# Patient Record
Sex: MALE | Race: WHITE
[De-identification: names, ages, dates, MRNs, and addresses within clinical notes are randomized per-mention and may not be internally consistent; named-entity substitution may affect disease eponyms.]

---

## 2017-12-28 ENCOUNTER — HOSPITAL ENCOUNTER (INPATIENT)
Dept: HOSPITAL 17 - NEPC | Age: 67
LOS: 7 days | Discharge: NURSING, REHAB OR LONG-TERM CARE | DRG: 871 | End: 2018-01-04
Attending: HOSPITALIST | Admitting: HOSPITALIST
Payer: COMMERCIAL

## 2017-12-28 VITALS — WEIGHT: 147.71 LBS | BODY MASS INDEX: 21.88 KG/M2 | HEIGHT: 69 IN

## 2017-12-28 VITALS
DIASTOLIC BLOOD PRESSURE: 65 MMHG | TEMPERATURE: 97.8 F | RESPIRATION RATE: 16 BRPM | SYSTOLIC BLOOD PRESSURE: 142 MMHG | HEART RATE: 101 BPM | OXYGEN SATURATION: 97 %

## 2017-12-28 VITALS
SYSTOLIC BLOOD PRESSURE: 135 MMHG | HEART RATE: 88 BPM | RESPIRATION RATE: 18 BRPM | DIASTOLIC BLOOD PRESSURE: 65 MMHG | OXYGEN SATURATION: 94 %

## 2017-12-28 VITALS — OXYGEN SATURATION: 98 %

## 2017-12-28 DIAGNOSIS — I67.2: ICD-10-CM

## 2017-12-28 DIAGNOSIS — N17.9: ICD-10-CM

## 2017-12-28 DIAGNOSIS — K59.00: ICD-10-CM

## 2017-12-28 DIAGNOSIS — E11.10: ICD-10-CM

## 2017-12-28 DIAGNOSIS — N13.30: ICD-10-CM

## 2017-12-28 DIAGNOSIS — R74.8: ICD-10-CM

## 2017-12-28 DIAGNOSIS — E86.0: ICD-10-CM
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
## 2017-12-28 DIAGNOSIS — A41.9: Primary | ICD-10-CM
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
## 2017-12-28 DIAGNOSIS — R60.9: ICD-10-CM

## 2017-12-28 DIAGNOSIS — R65.20: ICD-10-CM

## 2017-12-28 DIAGNOSIS — R32: ICD-10-CM

## 2017-12-28 DIAGNOSIS — R33.9: ICD-10-CM

## 2017-12-28 DIAGNOSIS — N32.0: ICD-10-CM

## 2017-12-28 DIAGNOSIS — S09.90XA: ICD-10-CM

## 2017-12-28 DIAGNOSIS — E88.09: ICD-10-CM

## 2017-12-28 DIAGNOSIS — N49.2: ICD-10-CM

## 2017-12-28 DIAGNOSIS — N50.89: ICD-10-CM

## 2017-12-28 DIAGNOSIS — D64.9: ICD-10-CM

## 2017-12-28 DIAGNOSIS — Z86.73: ICD-10-CM

## 2017-12-28 DIAGNOSIS — I63.9: ICD-10-CM

## 2017-12-28 DIAGNOSIS — B37.0: ICD-10-CM

## 2017-12-28 DIAGNOSIS — I31.3: ICD-10-CM

## 2017-12-28 DIAGNOSIS — Z82.49: ICD-10-CM

## 2017-12-28 DIAGNOSIS — D65: ICD-10-CM
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
## 2017-12-28 DIAGNOSIS — N39.0: ICD-10-CM

## 2017-12-28 DIAGNOSIS — Z83.3: ICD-10-CM

## 2017-12-28 DIAGNOSIS — E11.00: ICD-10-CM

## 2017-12-28 LAB
ALBUMIN SERPL-MCNC: 2.2 GM/DL (ref 3.4–5)
ALP SERPL-CCNC: 188 U/L (ref 45–117)
ALT SERPL-CCNC: 14 U/L (ref 12–78)
AST SERPL-CCNC: 21 U/L (ref 15–37)
BASOPHILS # BLD AUTO: 0 TH/MM3 (ref 0–0.2)
BASOPHILS NFR BLD: 0.1 % (ref 0–2)
BILIRUB SERPL-MCNC: 0.6 MG/DL (ref 0.2–1)
BUN SERPL-MCNC: 55 MG/DL (ref 7–18)
CALCIUM SERPL-MCNC: 7.7 MG/DL (ref 8.5–10.1)
CHLORIDE SERPL-SCNC: 77 MEQ/L (ref 98–107)
CREAT SERPL-MCNC: 3.14 MG/DL (ref 0.6–1.3)
EOSINOPHIL # BLD: 0 TH/MM3 (ref 0–0.4)
EOSINOPHIL NFR BLD: 0 % (ref 0–4)
ERYTHROCYTE [DISTWIDTH] IN BLOOD BY AUTOMATED COUNT: 12.5 % (ref 11.6–17.2)
GFR SERPLBLD BASED ON 1.73 SQ M-ARVRAT: 20 ML/MIN (ref 89–?)
GLUCOSE SERPL-MCNC: 1618 MG/DL (ref 74–106)
HCO3 BLD-SCNC: 12.8 MEQ/L (ref 21–32)
HCT VFR BLD CALC: 39 % (ref 39–51)
HGB BLD-MCNC: 12.1 GM/DL (ref 13–17)
LIPASE: 152 U/L (ref 73–393)
LYMPHOCYTES # BLD AUTO: 0.5 TH/MM3 (ref 1–4.8)
LYMPHOCYTES NFR BLD AUTO: 2.9 % (ref 9–44)
MAGNESIUM SERPL-MCNC: 1.8 MG/DL (ref 1.5–2.5)
MCH RBC QN AUTO: 30.2 PG (ref 27–34)
MCHC RBC AUTO-ENTMCNC: 31 % (ref 32–36)
MCV RBC AUTO: 97.3 FL (ref 80–100)
MONOCYTE #: 0.5 TH/MM3 (ref 0–0.9)
MONOCYTES NFR BLD: 3.2 % (ref 0–8)
NEUTROPHILS # BLD AUTO: 14.9 TH/MM3 (ref 1.8–7.7)
NEUTROPHILS NFR BLD AUTO: 93.8 % (ref 16–70)
PLATELET # BLD: 216 TH/MM3 (ref 150–450)
PMV BLD AUTO: 10.7 FL (ref 7–11)
PROT SERPL-MCNC: 5.8 GM/DL (ref 6.4–8.2)
RBC # BLD AUTO: 4.01 MIL/MM3 (ref 4.5–5.9)
SODIUM SERPL-SCNC: 107 MEQ/L (ref 136–145)
WBC # BLD AUTO: 15.9 TH/MM3 (ref 4–11)

## 2017-12-28 PROCEDURE — 71010: CPT

## 2017-12-28 PROCEDURE — 70450 CT HEAD/BRAIN W/O DYE: CPT

## 2017-12-28 PROCEDURE — 85730 THROMBOPLASTIN TIME PARTIAL: CPT

## 2017-12-28 PROCEDURE — 83880 ASSAY OF NATRIURETIC PEPTIDE: CPT

## 2017-12-28 PROCEDURE — 36556 INSERT NON-TUNNEL CV CATH: CPT

## 2017-12-28 PROCEDURE — 70544 MR ANGIOGRAPHY HEAD W/O DYE: CPT

## 2017-12-28 PROCEDURE — 84100 ASSAY OF PHOSPHORUS: CPT

## 2017-12-28 PROCEDURE — 82140 ASSAY OF AMMONIA: CPT

## 2017-12-28 PROCEDURE — 83605 ASSAY OF LACTIC ACID: CPT

## 2017-12-28 PROCEDURE — 80202 ASSAY OF VANCOMYCIN: CPT

## 2017-12-28 PROCEDURE — 84300 ASSAY OF URINE SODIUM: CPT

## 2017-12-28 PROCEDURE — 87086 URINE CULTURE/COLONY COUNT: CPT

## 2017-12-28 PROCEDURE — 93306 TTE W/DOPPLER COMPLETE: CPT

## 2017-12-28 PROCEDURE — 76870 US EXAM SCROTUM: CPT

## 2017-12-28 PROCEDURE — 84484 ASSAY OF TROPONIN QUANT: CPT

## 2017-12-28 PROCEDURE — 85384 FIBRINOGEN ACTIVITY: CPT

## 2017-12-28 PROCEDURE — 74176 CT ABD & PELVIS W/O CONTRAST: CPT

## 2017-12-28 PROCEDURE — 83036 HEMOGLOBIN GLYCOSYLATED A1C: CPT

## 2017-12-28 PROCEDURE — 82550 ASSAY OF CK (CPK): CPT

## 2017-12-28 PROCEDURE — 84443 ASSAY THYROID STIM HORMONE: CPT

## 2017-12-28 PROCEDURE — 85610 PROTHROMBIN TIME: CPT

## 2017-12-28 PROCEDURE — 82947 ASSAY GLUCOSE BLOOD QUANT: CPT

## 2017-12-28 PROCEDURE — 87641 MR-STAPH DNA AMP PROBE: CPT

## 2017-12-28 PROCEDURE — 96374 THER/PROPH/DIAG INJ IV PUSH: CPT

## 2017-12-28 PROCEDURE — 80061 LIPID PANEL: CPT

## 2017-12-28 PROCEDURE — 85025 COMPLETE CBC W/AUTO DIFF WBC: CPT

## 2017-12-28 PROCEDURE — 72125 CT NECK SPINE W/O DYE: CPT

## 2017-12-28 PROCEDURE — 93005 ELECTROCARDIOGRAM TRACING: CPT

## 2017-12-28 PROCEDURE — 80053 COMPREHEN METABOLIC PANEL: CPT

## 2017-12-28 PROCEDURE — 82150 ASSAY OF AMYLASE: CPT

## 2017-12-28 PROCEDURE — 80048 BASIC METABOLIC PNL TOTAL CA: CPT

## 2017-12-28 PROCEDURE — 87205 SMEAR GRAM STAIN: CPT

## 2017-12-28 PROCEDURE — 82948 REAGENT STRIP/BLOOD GLUCOSE: CPT

## 2017-12-28 PROCEDURE — 83735 ASSAY OF MAGNESIUM: CPT

## 2017-12-28 PROCEDURE — 83690 ASSAY OF LIPASE: CPT

## 2017-12-28 PROCEDURE — 81001 URINALYSIS AUTO W/SCOPE: CPT

## 2017-12-28 PROCEDURE — 93975 VASCULAR STUDY: CPT

## 2017-12-28 PROCEDURE — 83930 ASSAY OF BLOOD OSMOLALITY: CPT

## 2017-12-28 PROCEDURE — 82570 ASSAY OF URINE CREATININE: CPT

## 2017-12-28 PROCEDURE — 84155 ASSAY OF PROTEIN SERUM: CPT

## 2017-12-28 PROCEDURE — 82805 BLOOD GASES W/O2 SATURATION: CPT

## 2017-12-28 PROCEDURE — 70551 MRI BRAIN STEM W/O DYE: CPT

## 2017-12-28 PROCEDURE — 76937 US GUIDE VASCULAR ACCESS: CPT

## 2017-12-28 PROCEDURE — 80076 HEPATIC FUNCTION PANEL: CPT

## 2017-12-28 PROCEDURE — 87040 BLOOD CULTURE FOR BACTERIA: CPT

## 2017-12-28 PROCEDURE — 93880 EXTRACRANIAL BILAT STUDY: CPT

## 2017-12-28 PROCEDURE — 82010 KETONE BODYS QUAN: CPT

## 2017-12-28 NOTE — HHI.HP
HPI


Service


Critical Care Medicine


Primary Care Physician


No Primary Care Physician


Admission Diagnosis





DKA, new onset diabetes, renal deficiency, closed head injury


Diagnosis:  


Travel History


International Travel<30 Days:  No


Contact w/Intl Traveler <30 Da:  No


Traveled to Known Affected Are:  No


History of Present Illness


66 yo WM with no significant known medical history though he has not been 

followed by a physician who presents to [facility] emergency 

department complaining of two-week history of excessive thirst, weight loss, 

decreased appetite, and feeling weak.  Today he slipped and fell and hit his 

head on the tile floor.  His wife states no loss of consciousness.  Head CT 

showed no acute traumatic injury.  There is an age-indeterminate left thalamic 

infarct.  C-spine was negative.  He has hyperosmolar hyperglycemic state with 

glucose greater than 1600.  Beta hydroxybutyrate is normal.  Anion gap is 17.  

Potassium is 5.9.  He also has severe scrotal swelling and pain.  He states 

this is been going on for 2 days.  He has had urinary incontinence, dysuria and 

so the scrotal skin has remained wet and the skin is weeping and tender.   Now 

he states unable to void.  He denies flank pain, vomiting, diarrhea, fever, 

cough.  He has received 1 L normal saline bolus and insulin 6 units IV bolus in 

the emergency department.  Vancomycin and Zosyn has been ordered.  Ultrasound 

of the scrotum showed edema, normal flow to testicle.





Past Family Social History


Allergies:  


Coded Allergies:  


     No Known Allergies (Verified  Allergy, Mild, 10/5/07)


Past Medical History


None known


Past Surgical History


None


Reported Medications


Vitamins


Family History


Mother had diabetes mellitus


Father  of myocardial infarction in his 60s


No family history of cancer


Social History


Lifetime nonsmoker


No history of alcohol use


Retired 








Physical Exam


Vital Signs





Vital Signs








  Date Time  Temp Pulse Resp B/P (MAP) Pulse Ox O2 Delivery O2 Flow Rate FiO2


 


17 21:46     98 Room Air  


 


17 21:09 97.8 101 16 142/65 (90) 97 Room Air  








Physical Exam


GENERAL: Very thin  male sitting up in the stretcher.


HEAD: Atraumatic. Normocephalic. 


EYES: Pupils equal and round, 3 mm and reactive to 2 bilaterally.


ENT: No nasal bleeding or discharge.  Mucous membranes are dry.  There is 

thrush on his palate, buccal mucosa, gingiva. 


NECK: Trachea midline. No JVD. 


CARDIOVASCULAR: Regular rate and rhythm, no murmurs, rubs, gallops


RESPIRATORY: Breathing comfortably with no accessory muscle use or Kussmaul 

breathing.  Clear to auscultation bilaterally.  On room air.  No wheezes, Rales

, rhonchi.


GASTROINTESTINAL: Abdomen soft, nondistended, bowel sounds present, no rebound 

or guarding.  He does have faint erythema overlying lower abdomen and this 

extends up into the RLQ with edema there. I marked margins with a pen.  The 

edematous area is tender.  There is no mass/fluctuance.  No CVAT.  


SKIN/SCROTUM:  warm, dry, poor skin turgor without rash.   The scrotum is 

diffusely swollen and the skin is excoriated and weeping serous fluid. No 

fluctuant mass appreciated. No necrotizing skin lesion noted. No perianal 

erythema. 


MUSCULOSKELETAL: Extremities are very thin,  without clubbing, cyanosis, or 

edema. No obvious deformities. 


NEUROLOGICAL: Awake and alert. Confused but making jokes.  No obvious cranial 

nerve deficits.  Normal speech. Strength 5/5 all extremities.


Laboratory





Laboratory Tests








Test


  17


21:19 17


21:28


 


Blood Gas Puncture Site   


 


Blood Gas Patient Temperature 98.6  


 


Venous Blood pH 7.28  


 


Venous Blood Partial Pressure


CO2 30 


  


 


 


Venous Blood Partial Pressure


O2 43 


  


 


 


Venous Blood HCO3 14  


 


Venous Blood Oxygen Saturation 69  


 


Venous Blood Oxygen Content 11.8  


 


Venous Blood Base Excess -11.9  


 


Blood Gas Inspired Oxygen 21  


 


White Blood Count  15.9 


 


Red Blood Count  4.01 


 


Hemoglobin  12.1 


 


Hematocrit  39.0 


 


Mean Corpuscular Volume  97.3 


 


Mean Corpuscular Hemoglobin  30.2 


 


Mean Corpuscular Hemoglobin


Concent 


  31.0 


 


 


Red Cell Distribution Width  12.5 


 


Platelet Count  216 


 


Mean Platelet Volume  10.7 


 


Neutrophils (%) (Auto)  93.8 


 


Lymphocytes (%) (Auto)  2.9 


 


Monocytes (%) (Auto)  3.2 


 


Eosinophils (%) (Auto)  0.0 


 


Basophils (%) (Auto)  0.1 


 


Neutrophils # (Auto)  14.9 


 


Lymphocytes # (Auto)  0.5 


 


Monocytes # (Auto)  0.5 


 


Eosinophils # (Auto)  0.0 


 


Basophils # (Auto)  0.0 


 


CBC Comment  DIFF FINAL 


 


Differential Comment   


 


Blood Urea Nitrogen  55 


 


Creatinine  3.14 


 


Random Glucose  1618 


 


Total Protein  5.8 


 


Albumin  2.2 


 


Calcium Level  7.7 


 


Magnesium Level  1.8 


 


Alkaline Phosphatase  188 


 


Aspartate Amino Transf


(AST/SGOT) 


  21 


 


 


Alanine Aminotransferase


(ALT/SGPT) 


  14 


 


 


Total Bilirubin  0.6 


 


Sodium Level  107 


 


Potassium Level  5.9 


 


Chloride Level  77 


 


Carbon Dioxide Level  12.8 


 


Anion Gap  17 


 


Estimat Glomerular Filtration


Rate 


  20 


 


 


Lipase  152 


 


B-Hydroxybutyrate  0.39 








Result Diagram:  


17








Septic Shock Reassessment


Septic shock perfusion:  reassessment completed





Caprini VTE Risk Assessment


Caprini VTE Risk Assessment:  Mod/High Risk (score >= 2)


Caprini Risk Assessment Model











 Point Value = 1          Point Value = 2  Point Value = 3  Point Value = 5


 


Age 41-60


Minor surgery


BMI > 25 kg/m2


Swollen legs


Varicose veins


Pregnancy or postpartum


History of unexplained or recurrent


   spontaneous 


Oral contraceptives or hormone


   replacement


Sepsis (< 1 month)


Serious lung disease, including


   pneumonia (< 1 month)


Abnormal pulmonary function


Acute myocardial infarction


Congestive heart failure (< 1 month)


History of inflammatory bowel disease


Medical patient at bed rest Age 61-74


Arthroscopic surgery


Major open surgery (> 45 min)


Laparoscopic surgery (> 45 min)


Malignancy


Confined to bed (> 72 hours)


Immobilizing plaster cast


Central venous access Age >= 75


History of VTE


Family history of VTE


Factor V Leiden


Prothrombin 72548N


Lupus anticoagulant


Anticardiolipin antibodies


Elevated serum homocysteine


Heparin-induced thrombocytopenia


Other congenital or acquired


   thrombophilia Stroke (< 1 month)


Elective arthroplasty


Hip, pelvis, or leg fracture


Acute spinal cord injury (< 1 month)








Prophylaxis Regimen











   Total Risk


Factor Score Risk Level Prophylaxis Regimen


 


0-1      Low Early ambulation


 


2 Moderate Order ONE of the following:


*Sequential Compression Device (SCD)


*Heparin 5000 units SQ BID


 


3-4 Higher Order ONE of the following medications:


*Heparin 5000 units SQ TID


*Enoxaparin/Lovenox 40 mg SQ daily (WT < 150 kg, CrCl > 30 mL/min)


*Enoxaparin/Lovenox 30 mg SQ daily (WT < 150 kg, CrCl > 10-29 mL/min)


*Enoxaparin/Lovenox 30 mg SQ BID (WT < 150 kg, CrCl > 30 mL/min)


AND/OR


*Sequential Compression Device (SCD)


 


5 or more Highest Order ONE of the following medications:


*Heparin 5000 units SQ TID (Preferred with Epidurals)


*Enoxaparin/Lovenox 40 mg SQ daily (WT < 150 kg, CrCl > 30 mL/min)


*Enoxaparin/Lovenox 30 mg SQ daily (WT < 150 kg, CrCl > 10-29 mL/min)


*Enoxaparin/Lovenox 30 mg SQ BID (WT < 150 kg, CrCl > 30 mL/min)


AND


*Sequential Compression Device (SCD)











Assessment and Plan


Problem List:  


(1) Type 2 diabetes mellitus with hyperosmolar nonketotic hyperglycemia


ICD Code:  E11.01 - Type 2 diabetes mellitus with hyperosmolarity with coma


Status:  Chronic


(2) GRACIELA (acute kidney injury)


ICD Code:  N17.9 - Acute kidney failure, unspecified


Status:  Acute


(3) Lactic acidemia


ICD Code:  E87.2 - Acidosis


Status:  Acute


(4) Closed head injury


ICD Code:  S09.90XA - Unspecified injury of head, initial encounter


Status:  Acute


(5) Leukocytosis


ICD Code:  D72.829 - Elevated white blood cell count, unspecified


Status:  Acute


(6) Cellulitis of scrotum


ICD Code:  N49.2 - Inflammatory disorders of scrotum


Status:  Acute


(7) Fall


ICD Code:  W19.XXXA - Unspecified fall, initial encounter


Status:  Acute


(8) Lacunar stroke


ICD Code:  I63.9 - Cerebral infarction, unspecified


Status:  Chronic


(9) Urinary retention


ICD Code:  R33.9 - Retention of urine, unspecified


Status:  Acute


(10) Hydronephrosis


ICD Code:  N13.30 - Unspecified hydronephrosis


Status:  Acute


(11) Thrush


ICD Code:  B37.0 - Candidal stomatitis


Status:  Acute


(12) Severe sepsis


ICD Code:  A41.9 - Sepsis, unspecified organism; R65.20 - Severe sepsis without 

septic shock


Assessment and Plan


NEURO:


Fall 


Silent Left thalamic lacunar infarct, age indeterminate, probably chronic


CT brain - age indeterminate L thalamic lacunar infarct


CT Cspine - no acute fracture. Degen changes. 


Check lipids, carotid ultrasound, Echo





RESP:


On room air





CV:


Lactic acidemia


Monitor hemodynamics


Check baseline EKG, trend troponins. 


Obtain Echo. 


Trend lactic acid





GI: NPO





FEN/RENAL: 


Acute kidney injury


corrected sodium ~129 f/u 132. 


Baseline  creatinine unknown. 


Clinically severely dehydrated


Received 1 L NS bolus in the ED. Will bolus additional 1 L. NS @ 250ml/hr  


Insert urinary catheter. 


Monitor serial electrolytes.  Replace electrolytes as indicated. 


Check CPK


CT abd/pelvis will asses for hydro. 








ID:


Severe Sepsis


Leukocytosis


Thrush


Dysuria


Scrotal cellulitis


Vancomycin/zosyn/Clinda empirically for scrotal cellulitis. Scrotal u/s showed 

edema, normal testicular flow.  Will obtain CT abd/pelvis. 


Followup U/A/cx, obtain blood culture. 


Diflucan 100 mg IV x1. 


Nystatin for thrush. 





HEME: Chronic anemia





ENDO:


Hyperosmolar hyperglycemic state


Insulin drip with serial labs per hyperglycemic emergency protocol. 


Follow-up hemoglobin A1c


Obtain TSH





PROPH: SCDs for DVT prophylaxis.  Hold pharmacologic DVT prophylaxis pending 

results of CT scan.  Famotidine for stress ulcer prophylaxis.





ACCESS: Peripheral IV providing adequate access at this time.





Patient is critically ill with Hyperosmolar hyperglycemic state and GRACIELA 

requiring fluid rehydration, electrolyte monitoring and replacement to avoid 

coma and progression to multiorgan failure or death.  





Patient and his wife were updated at bedside.





Patient is full code.  He would like his wife to make medical decisions on his 

behalf if he becomes incapacitated.





Critical care time 50 minutes exclusive of separately billable procedures.





Problem Qualifiers





(1) Closed head injury:  


Qualified Codes:  S09.90XA - Unspecified injury of head, initial encounter


(2) Fall:  


Qualified Codes:  W19.XXXA - Unspecified fall, initial encounter








Kimberly Dupree MD Dec 28, 2017 23:23

## 2017-12-28 NOTE — RADRPT
EXAM DATE/TIME:  12/28/2017 21:57 

 

[facility] COMPARISON:     

No previous studies available for comparison.

        

 

 

INDICATIONS :     

Scrotal swelling and pain.

                     

 

MEDICAL HISTORY :          

Scrotal swelling and pain.

 

SURGICAL HISTORY :     

None. 

 

ENCOUNTER:      

Initial

 

ACUITY:      

2 days

 

PAIN SCORE:      

10/10

 

LOCATION:      

Bilateral  scrotum.

                     

 

MEASUREMENTS:     

 

RIGHT TESTICLE:        

3.9 x 3.0 x 2.6cm     

 

LEFT TESTICLE:        

3.8 x 3.0 x 2.7cm     

 

FINDINGS:     

 

RIGHT TESTICLE:     

Homogeneous echotexture without intra or extratesticular mass.  Blood flow is symmetric and within no
rmal limits.  No hydrocele or varicocele.  Epididymis is within normal limits.  

 

LEFT TESTICLE:     

Homogeneous echotexture without intra or extratesticular mass.  Blood flow is symmetric and within no
rmal limits.  No hydrocele or varicocele.  Epididymis is within normal limits.  

 

SCROTUM:     

Pronounced scrotal edema diffusely

 

CONCLUSION:     

Prominent scrotal edema. Testicles are unremarkable

 

 

 

 Jd Steward MD on December 28, 2017 at 22:27           

Board Certified Radiologist.

 This report was verified electronically.

## 2017-12-28 NOTE — RADRPT
EXAM DATE/TIME:  12/28/2017 22:28 

 

[facility] COMPARISON:     

No previous studies available for comparison.

 

 

INDICATIONS :     

Trauma, patient fell.

                      

 

RADIATION DOSE:     

56.35 CTDIvol (mGy) 

 

 

 

MEDICAL HISTORY :     

None  

 

SURGICAL HISTORY :      

None. 

 

ENCOUNTER:      

Initial

 

ACUITY:      

1 day

 

PAIN SCALE:      

3/10

 

LOCATION:        

cranial 

 

TECHNIQUE:     

Multiple contiguous axial images were obtained of the head.  Using automated exposure control and adj
ustment of the mA and/or kV according to patient size, radiation dose was kept as low as reasonably a
chievable to obtain optimal diagnostic quality images.   DICOM format image data is available electro
nically for review and comparison.  

 

FINDINGS:     

 

CEREBRUM:     

Age-indeterminate left thalamic lacunar infarct. The ventricles are normal for age.  No evidence of m
idline shift, mass lesion, or hemorrhage.  No extra-axial fluid collections are seen.

 

POSTERIOR FOSSA:     

The cerebellum and brainstem are intact.  The 4th ventricle is midline.  The cerebellopontine angle i
s unremarkable.

 

EXTRACRANIAL:     

Mild partial opacification right maxillary sinus.

 

SKULL:     

The calvaria is intact.  No evidence of skull fracture.

 

CONCLUSION:     Age-indeterminate left thalamic lacunar infarct. No other acute intracranial findings
 identified. 

 

 

 Henry Zepeda MD on December 28, 2017 at 23:15           

Board Certified Radiologist.

 This report was verified electronically.

## 2017-12-28 NOTE — RADRPT
EXAM DATE/TIME:  12/28/2017 23:27 

 

[facility] COMPARISON:     

No previous studies available for comparison.

 

                     

INDICATIONS :     

Dizziness post Fall, positive LOC

                     

 

MEDICAL HISTORY :     

None.          

 

SURGICAL HISTORY :     

None.   

 

ENCOUNTER:     

Initial                                        

 

ACUITY:     

1 day      

 

PAIN SCORE:     

8/10

LOCATION:     Bilateral chest 

 

FINDINGS:     

Single AP view of the chest. The lungs are clear. Skinfold on the left mimicking a pneumothorax. Vasc
ular markings extends lateral to the skinfold. Cardiomediastinal silhouette within normal limits. No 
evidence of pleural effusion or pneumothorax.

 

CONCLUSION:         No acute cardiopulmonary disease identified.

 

 

 

 Henyr Zepeda MD on December 28, 2017 at 23:54           

Board Certified Radiologist.

 This report was verified electronically.

## 2017-12-28 NOTE — RADRPT
EXAM DATE/TIME:  12/28/2017 22:29 

 

[facility] COMPARISON:     

No previous studies available for comparison.

 

 

INDICATIONS :     

Trauma, patient fell.

                      

 

RADIATION DOSE:     

56.35 CTDIvol (mGy) 

 

 

 

MEDICAL HISTORY :     

None  

 

SURGICAL HISTORY :      

None. 

 

ENCOUNTER:      

Initial

 

ACUITY:      

1 day

 

PAIN SCALE:      

5/10

 

LOCATION:       

Bilateral cranial 

 

TECHNIQUE:     

Volumetric scanning of the cervical spine was performed. Multiplanar reconstructions in the sagittal,
 coronal and oblique axial planes were performed.   Using automated exposure control and adjustment o
f the mA and/or kV according to patient size, radiation dose was kept as low as reasonably achievable
 to obtain optimal diagnostic quality images.   DICOM format image data is available electronically f
or review and comparison.  

 

FINDINGS:     

 

VERTEBRAE:     

Normal vertebral body height. No evidence of fracture.

 

ALIGNMENT:     

No evidence of subluxation.

 

C2-C3: 

Mild left-sided facet arthrosis. No evidence of focal disc protrusion. Central canal normal diameter.
 Neural foraminal diameters within normal limits.

 

C3-C4: 

Severe left-sided facet arthrosis. Mild left neural foraminal narrowing. Central canal diameter withi
n normal limits.

 

C4-C5: 

Mild bilateral facet arthrosis. No evidence of focal disc protrusion. Central canal normal diameter. 
Neural foraminal diameters within normal limits.

 

C5-C6: 

Bilateral facet arthrosis. Minimal broad-based disc osteophyte complex. Central canal diameter within
 normal limits. Neuroforaminal diameters within normal limits.

 

C6-C7: 

Bilateral facet arthrosis. Moderate right neural foraminal narrowing. Central canal diameter within n
ormal limits.

 

C7-T1:  

The bony spinal canal is normal in size.  No evidence of disc bulge or herniation.  The neural forami
na are bilaterally patent.

 

CONCLUSION:     

No evidence of fracture. Multilevel degenerative findings.

 

 

 

 Henry Zepeda MD on December 28, 2017 at 23:11           

Board Certified Radiologist.

 This report was verified electronically.

## 2017-12-29 VITALS
OXYGEN SATURATION: 99 % | SYSTOLIC BLOOD PRESSURE: 103 MMHG | HEART RATE: 87 BPM | DIASTOLIC BLOOD PRESSURE: 56 MMHG | TEMPERATURE: 98 F | RESPIRATION RATE: 21 BRPM

## 2017-12-29 VITALS
TEMPERATURE: 97.7 F | RESPIRATION RATE: 19 BRPM | DIASTOLIC BLOOD PRESSURE: 53 MMHG | SYSTOLIC BLOOD PRESSURE: 97 MMHG | HEART RATE: 85 BPM | OXYGEN SATURATION: 99 %

## 2017-12-29 VITALS
OXYGEN SATURATION: 97 % | DIASTOLIC BLOOD PRESSURE: 54 MMHG | HEART RATE: 84 BPM | SYSTOLIC BLOOD PRESSURE: 95 MMHG | TEMPERATURE: 98.6 F | RESPIRATION RATE: 24 BRPM

## 2017-12-29 VITALS
TEMPERATURE: 97.6 F | DIASTOLIC BLOOD PRESSURE: 69 MMHG | HEART RATE: 92 BPM | OXYGEN SATURATION: 95 % | SYSTOLIC BLOOD PRESSURE: 131 MMHG | RESPIRATION RATE: 20 BRPM

## 2017-12-29 VITALS
HEART RATE: 88 BPM | SYSTOLIC BLOOD PRESSURE: 133 MMHG | TEMPERATURE: 97.7 F | OXYGEN SATURATION: 99 % | RESPIRATION RATE: 20 BRPM | DIASTOLIC BLOOD PRESSURE: 65 MMHG

## 2017-12-29 VITALS — OXYGEN SATURATION: 98 %

## 2017-12-29 VITALS
OXYGEN SATURATION: 95 % | RESPIRATION RATE: 18 BRPM | HEART RATE: 94 BPM | DIASTOLIC BLOOD PRESSURE: 57 MMHG | SYSTOLIC BLOOD PRESSURE: 136 MMHG

## 2017-12-29 VITALS — HEART RATE: 84 BPM

## 2017-12-29 VITALS — HEART RATE: 92 BPM

## 2017-12-29 VITALS — OXYGEN SATURATION: 100 %

## 2017-12-29 VITALS — HEART RATE: 91 BPM

## 2017-12-29 VITALS — HEART RATE: 90 BPM

## 2017-12-29 VITALS — HEART RATE: 89 BPM

## 2017-12-29 LAB
BACTERIA #/AREA URNS HPF: (no result) /HPF
BUN SERPL-MCNC: 57 MG/DL (ref 7–18)
BUN SERPL-MCNC: 59 MG/DL (ref 7–18)
BUN SERPL-MCNC: 60 MG/DL (ref 7–18)
CALCIUM SERPL-MCNC: 7.6 MG/DL (ref 8.5–10.1)
CALCIUM SERPL-MCNC: 7.8 MG/DL (ref 8.5–10.1)
CALCIUM SERPL-MCNC: 8.1 MG/DL (ref 8.5–10.1)
CHLORIDE SERPL-SCNC: 89 MEQ/L (ref 98–107)
CHLORIDE SERPL-SCNC: 96 MEQ/L (ref 98–107)
CHLORIDE SERPL-SCNC: 97 MEQ/L (ref 98–107)
CHOLEST SERPL-MCNC: 58 MG/DL (ref 120–200)
CHOLESTEROL/ HDL RATIO: 2.5 RATIO
COLOR UR: (no result)
CREAT SERPL-MCNC: 2.11 MG/DL (ref 0.6–1.3)
CREAT SERPL-MCNC: 2.41 MG/DL (ref 0.6–1.3)
CREAT SERPL-MCNC: 3.04 MG/DL (ref 0.6–1.3)
FIBRINOGEN PPP-MCNC: 602 MG/DL (ref 227–377)
GFR SERPLBLD BASED ON 1.73 SQ M-ARVRAT: 21 ML/MIN (ref 89–?)
GFR SERPLBLD BASED ON 1.73 SQ M-ARVRAT: 27 ML/MIN (ref 89–?)
GFR SERPLBLD BASED ON 1.73 SQ M-ARVRAT: 31 ML/MIN (ref 89–?)
GLUCOSE SERPL-MCNC: 1168 MG/DL (ref 74–106)
GLUCOSE SERPL-MCNC: 470 MG/DL (ref 74–106)
GLUCOSE SERPL-MCNC: 485 MG/DL (ref 74–106)
GLUCOSE UR STRIP-MCNC: 1000 MG/DL
HCO3 BLD-SCNC: 13.9 MEQ/L (ref 21–32)
HCO3 BLD-SCNC: 17.3 MEQ/L (ref 21–32)
HCO3 BLD-SCNC: 18.6 MEQ/L (ref 21–32)
HDLC SERPL-MCNC: 23.2 MG/DL (ref 40–60)
HGB UR QL STRIP: (no result)
INR PPP: 1.2 RATIO
KETONES UR STRIP-MCNC: (no result) MG/DL
LDLC SERPL-MCNC: 19 MG/DL (ref 0–99)
MAGNESIUM SERPL-MCNC: 1.7 MG/DL (ref 1.5–2.5)
MAGNESIUM SERPL-MCNC: 1.8 MG/DL (ref 1.5–2.5)
MAGNESIUM SERPL-MCNC: 1.8 MG/DL (ref 1.5–2.5)
NITRITE UR QL STRIP: (no result)
PHOSPHATE SERPL-MCNC: 2.3 MG/DL (ref 2.5–4.9)
PHOSPHATE SERPL-MCNC: 2.5 MG/DL (ref 2.5–4.9)
PHOSPHATE SERPL-MCNC: 2.9 MG/DL (ref 2.5–4.9)
PROTHROMBIN TIME: 12.2 SEC (ref 9.8–11.6)
SODIUM SERPL-SCNC: 118 MEQ/L (ref 136–145)
SODIUM SERPL-SCNC: 126 MEQ/L (ref 136–145)
SODIUM SERPL-SCNC: 126 MEQ/L (ref 136–145)
SODIUM,RANDOM URINE: 26 MEQ/L
SP GR UR STRIP: 1.02 (ref 1–1.03)
SQUAMOUS #/AREA URNS HPF: 1 /HPF (ref 0–5)
TRIGL SERPL-MCNC: 77 MG/DL (ref 42–150)
TROPONIN I SERPL-MCNC: 0.02 NG/ML (ref 0.02–0.05)
URINE LEUKOCYTE ESTERASE: (no result)
WHITE BLOOD CELL CLUMPS: (no result)

## 2017-12-29 PROCEDURE — 0T9B70Z DRAINAGE OF BLADDER WITH DRAINAGE DEVICE, VIA NATURAL OR ARTIFICIAL OPENING: ICD-10-PCS | Performed by: EMERGENCY MEDICINE

## 2017-12-29 RX ADMIN — CLINDAMYCIN PHOSPHATE SCH MLS/HR: 600 INJECTION, SOLUTION INTRAVENOUS at 23:41

## 2017-12-29 RX ADMIN — CHLORHEXIDINE GLUCONATE SCH PACK: 500 CLOTH TOPICAL at 04:00

## 2017-12-29 RX ADMIN — MORPHINE SULFATE PRN MG: 2 INJECTION, SOLUTION INTRAMUSCULAR; INTRAVENOUS at 03:41

## 2017-12-29 RX ADMIN — NYSTATIN SCH ML: 100000 SUSPENSION ORAL at 11:52

## 2017-12-29 RX ADMIN — HEPARIN SODIUM SCH UNITS: 10000 INJECTION, SOLUTION INTRAVENOUS; SUBCUTANEOUS at 21:36

## 2017-12-29 RX ADMIN — CLINDAMYCIN PHOSPHATE SCH MLS/HR: 600 INJECTION, SOLUTION INTRAVENOUS at 15:36

## 2017-12-29 RX ADMIN — CLOTRIMAZOLE SCH MG: 10 LOZENGE ORAL at 21:35

## 2017-12-29 RX ADMIN — MAGNESIUM SULFATE IN DEXTROSE SCH MLS/HR: 10 INJECTION, SOLUTION INTRAVENOUS at 23:41

## 2017-12-29 RX ADMIN — HUMAN INSULIN SCH: 100 INJECTION, SOLUTION SUBCUTANEOUS at 17:00

## 2017-12-29 RX ADMIN — MAGNESIUM SULFATE IN DEXTROSE SCH MLS/HR: 10 INJECTION, SOLUTION INTRAVENOUS at 15:00

## 2017-12-29 RX ADMIN — HUMAN INSULIN SCH: 100 INJECTION, SOLUTION SUBCUTANEOUS at 21:35

## 2017-12-29 RX ADMIN — CLINDAMYCIN PHOSPHATE SCH MLS/HR: 600 INJECTION, SOLUTION INTRAVENOUS at 02:23

## 2017-12-29 RX ADMIN — PHENYTOIN SODIUM SCH MLS/HR: 50 INJECTION INTRAMUSCULAR; INTRAVENOUS at 16:50

## 2017-12-29 RX ADMIN — CLINDAMYCIN PHOSPHATE SCH MLS/HR: 600 INJECTION, SOLUTION INTRAVENOUS at 08:00

## 2017-12-29 RX ADMIN — STANDARDIZED SENNA CONCENTRATE AND DOCUSATE SODIUM SCH TAB: 8.6; 5 TABLET, FILM COATED ORAL at 21:00

## 2017-12-29 RX ADMIN — FAMOTIDINE SCH MG: 20 TABLET, FILM COATED ORAL at 08:19

## 2017-12-29 RX ADMIN — NYSTATIN SCH ML: 100000 SUSPENSION ORAL at 08:19

## 2017-12-29 RX ADMIN — MAGNESIUM SULFATE IN DEXTROSE SCH MLS/HR: 10 INJECTION, SOLUTION INTRAVENOUS at 14:00

## 2017-12-29 RX ADMIN — TAZOBACTAM SODIUM AND PIPERACILLIN SODIUM SCH MLS/HR: 250; 2 INJECTION, SOLUTION INTRAVENOUS at 14:00

## 2017-12-29 RX ADMIN — STANDARDIZED SENNA CONCENTRATE AND DOCUSATE SODIUM SCH TAB: 8.6; 5 TABLET, FILM COATED ORAL at 08:19

## 2017-12-29 RX ADMIN — FAMOTIDINE SCH MG: 20 TABLET, FILM COATED ORAL at 21:00

## 2017-12-29 RX ADMIN — MAGNESIUM SULFATE IN DEXTROSE SCH MLS/HR: 10 INJECTION, SOLUTION INTRAVENOUS at 21:34

## 2017-12-29 RX ADMIN — PHENYTOIN SODIUM SCH MLS/HR: 50 INJECTION INTRAMUSCULAR; INTRAVENOUS at 02:35

## 2017-12-29 RX ADMIN — CLOTRIMAZOLE SCH MG: 10 LOZENGE ORAL at 14:00

## 2017-12-29 RX ADMIN — PHENYTOIN SODIUM SCH MLS/HR: 50 INJECTION INTRAMUSCULAR; INTRAVENOUS at 01:02

## 2017-12-29 RX ADMIN — PHENYTOIN SODIUM SCH MLS/HR: 50 INJECTION INTRAMUSCULAR; INTRAVENOUS at 21:36

## 2017-12-29 RX ADMIN — CLOTRIMAZOLE SCH MG: 10 LOZENGE ORAL at 17:19

## 2017-12-29 RX ADMIN — TAZOBACTAM SODIUM AND PIPERACILLIN SODIUM SCH MLS/HR: 250; 2 INJECTION, SOLUTION INTRAVENOUS at 21:34

## 2017-12-29 RX ADMIN — PHENYTOIN SODIUM SCH MLS/HR: 50 INJECTION INTRAMUSCULAR; INTRAVENOUS at 10:35

## 2017-12-29 RX ADMIN — Medication SCH ML: at 21:35

## 2017-12-29 RX ADMIN — Medication SCH ML: at 09:00

## 2017-12-29 RX ADMIN — PHENYTOIN SODIUM SCH MLS/HR: 50 INJECTION INTRAMUSCULAR; INTRAVENOUS at 14:00

## 2017-12-29 NOTE — RADRPT
EXAM DATE/TIME:  12/29/2017 02:42 

 

[facility] COMPARISON:     

No previous studies available for comparison.

 

 

INDICATIONS :     

Abdominal wall and scrotal cellulitis. Evaluate for necrotizing fascities. 

                  

 

ORAL CONTRAST:      

No oral contrast ingested.

                  

 

RADIATION DOSE:     

6.64 CTDIvol (mGy) 

 

 

MEDICAL HISTORY :     

Hernia, inguinal.  Diabetes. 

 

SURGICAL HISTORY :      

None. 

 

ENCOUNTER:      

Initial

 

ACUITY:      

1 day

 

PAIN SCALE:      

0/10

 

LOCATION:         

Abdomen.

 

TECHNIQUE:     

Volumetric scanning of the abdomen and pelvis was performed.  Using automated exposure control and ad
justment of the mA and/or kV according to patient size, radiation dose was kept as low as reasonably 
achievable to obtain optimal diagnostic quality images.  DICOM format image data is available electro
nically for review and comparison.  

 

FINDINGS:     

 

LOWER LUNGS:     

The visualized lower lungs are clear.

 

LIVER:     

Homogeneous density without lesion.  There is no dilation of the biliary tree.  No calcified gallston
es.

 

SPLEEN:     

Normal size without lesion.

 

PANCREAS:     

Within normal limits. 

 

KIDNEYS:     

Moderate grade bilateral hydronephrosis and diffuse hydroureter. Kidneys otherwise within normal limi
ts.

 

ADRENAL GLANDS:     

Within normal limits.

 

VASCULAR:     

There is no aortic aneurysm.

 

BOWEL/MESENTERY:     

No evidence of bowel dilatation. No free air or free fluid. Appendix within normal limits.

 

ABDOMINAL WALL:     

Within normal limits.

 

RETROPERITONEUM:     

There is no lymphadenopathy.

 

BLADDER:     

Markedly distended urinary bladder.

 

REPRODUCTIVE:     

Severe diffuse scrotal skin thickening and diffuse scrotal edema. Elongated fluid density measuring 5
.5 x 1.0 cm is seen extending within the left side of the base of the penis. Severe diffuse superfici
al soft tissue edema/anasarca of the abdomen, pelvis, and proximal lower extremities. No gas is seen 
in the soft tissues.

 

MUSCULOSKELETAL:     

Within normal limits for patient age.

 

CONCLUSION:     

1. Marked scrotal skin thickening and scrotal edema. No gas identified in the soft tissues.

 

2.  Elongated fluid density within the left side of the base of the penis may represent early abscess
 formation.

3. Diffuse severe superficial soft tissue edema of the lower abdomen, pelvis, and proximal lower extr
emities.

4. Moderate bilateral hydronephrosis and diffuse hydroureter as well as marked distention of urinary 
bladder. Findings suggest possible bladder outlet obstruction. Prostate measures 4.1 x 2.9 cm in guzman
sverse dimensions. 

 

 Henry Zepeda MD on December 29, 2017 at 3:15           

Board Certified Radiologist.

 This report was verified electronically.

## 2017-12-29 NOTE — RADRPT
EXAM DATE/TIME:  12/29/2017 08:18 

 

[facility] COMPARISON:     

No previous studies available for comparison.

        

 

 

INDICATIONS :     

Cerebrovascular accident.

                     

 

MEDICAL HISTORY :           

Inguinal hernia.

 

SURGICAL HISTORY :     

None.     

 

ENCOUNTER:     

Initial

 

ACUITY:     

1 day

 

PAIN SCORE:     

0/10

 

LOCATION:     

Bilateral neck 

                     

PEAK SYSTOLIC VELOCITIES (cm/sec):

 

ICA/CCA RATIO:                    

Right: 0.8     Left: 0.8

 

ICA:                          

Right: 56     Left: 62

 

CCA:                          

Right: 67     Left: 81

 

ECA:                           

Right: 71     Left: 86

 

VERTEBRAL:           

Right: 44 antegrade     Left: 38 antegrade

             

Elevated flow velocities and ICA/CCA ratios have been found to correlate with increased degrees of

vessel stenosis, calculated as percentage of diameter relative to a normal segment of distal ICA/CCA

 

FINDINGS:     

 

RIGHT CAROTID:     

No significant stenosis is visualized.  The waveforms are within normal limits.

 

LEFT CAROTID:     

No significant stenosis is visualized.  The waveforms are within normal limits.

 

VERTEBRAL ARTERIES:  

Antegrade flow is seen in both vertebral arteries.

 

MISCELLANEOUS:     

None.

 

CONCLUSION:     

No evidence of flow-limiting carotid stenosis.

 

 

 

 

 Jd Steward MD on December 29, 2017 at 9:07           

Board Certified Radiologist.

 This report was verified electronically.

## 2017-12-29 NOTE — EKG
Date Performed: 12/29/2017       Time Performed: 00:27:16

 

PTAGE:      67 years

 

EKG:      Sinus rhythm 

 

 SEPTAL MYOCARDIAL INFARCTION ABNORMAL ECG 

 

NO PREVIOUS TRACING            

 

DOCTOR:   Julio Calvillo  Interpretating Date/Time  12/29/2017 14:09:55

## 2017-12-29 NOTE — PD.PROCEDR
Central Line Procedure


REASON FOR PROCEDURE


Central venous access





PROCEDURE PERFORMED


Central line placement: Right IJ CVL





CONSENT


Informed consent for procedure was obtained.  The risks and benefits of the 

procedure were discussed to include but limited to bleeding, clot formation, 

infection, and even death.





ANESTHESIA


Local injection of 1% Lidocaine





DESCRIPTION OF THE PROCEDURE


The patient was placed in supine, mild Trendelenburg position.  The area was 

exposed and cleansed with ChloraPrep, times two.  Large sterile drape was used 

to cover the patient, with the site exposed, under sterile conditions including 

cap, face mask, sterile gown, and sterile gloves.  On single attempt, the 

introducer needle was inserted with negative pressure in syringe and venous 

flash was obtained.  The guide wire was then advanced without any restriction 

and the needle was removed.  The dilator was used without any complications.  

Using Seldinger technique the abx coated TL catheter was advanced over the 

guide wire to a depth of 15 centimeters.  The guide wire was removed.  All 

ports were aspirated with dark venous blood return and flushed easily with 

sterile saline.  All ports were capped.  Antibiotic disc was placed around 

central line at puncture site.  The central line was secured to the skin with 

two interrupted 2.0 silk sutures.  The area was bandaged with sterile see-

through central line bandage.





RADIOLOGICAL DATA


Ultrasound guidance was used to locate Right IJ.  Doppler/color flow was used 

to confirm venous flow.





COMPLICATIONS:


No apparent complications





ESTIMATED BLOOD LOSS:


Less than 1 cc.











Scar Leggett MD Dec 29, 2017 09:27

## 2017-12-29 NOTE — HHI.CCPN
Subjective


Remarks/Hospital Course


68 yo WM with no significant known medical history though he has not been 

followed by a physician who presents to [facility] emergency 

department complaining of two-week history of excessive thirst, weight loss, 

decreased appetite, and feeling weak.  Today he slipped and fell and hit his 

head on the tile floor.  His wife states no loss of consciousness.  Head CT 

showed no acute traumatic injury.  There is an age-indeterminate left thalamic 

infarct.  C-spine was negative.  He has hyperosmolar hyperglycemic state with 

glucose greater than 1600.  Beta hydroxybutyrate is normal.  Anion gap is 17.  

Potassium is 5.9.  He also has severe scrotal swelling and pain.  He states 

this is been going on for 2 days.  He has had urinary incontinence, dysuria and 

so the scrotal skin has remained wet and the skin is weeping and tender.   Now 

he states unable to void.  He denies flank pain, vomiting, diarrhea, fever, 

cough.  He has received 1 L normal saline bolus and insulin 6 units IV bolus in 

the emergency department.  Vancomycin and Zosyn has been ordered.  Ultrasound 

of the scrotum showed edema, normal flow to testicle.





SUBJECTIVE:





12/29: Patient is mean arterial pressures 60-70 past few hours.  Received 2 L 

bolus normal saline currently on normal saline at 250 cc an hour.  Central line 

placed this a.m. for lab draws/impending need for vasopressors.  Patient is 

arousable and follows simple commands.  Remains lethargic.  Did receive 

midazolam for Bautista placement this a.m. 2 mg





Objective





Vital Signs








  Date Time  Temp Pulse Resp B/P (MAP) Pulse Ox O2 Delivery O2 Flow Rate FiO2


 


12/29/17 03:20 97.6 92 20 131/69 (89) 95   


 


12/29/17 01:00      Room Air  














Intake and Output   


 


 12/29/17 12/29/17 12/30/17





 08:00 16:00 00:00


 


Intake Total 1450 ml  


 


Balance 1450 ml  








Result Diagram:  


12/28/17 2128                                                                  

              12/29/17 0337





Other Results





Microbiology








 Date/Time


Source Procedure


Growth Status


 


 


 12/29/17 01:25


Blood Peripheral Aerobic Blood Culture


Pending Received


 


 12/29/17 01:25


Blood Peripheral Anaerobic Blood Culture


Pending Received








Imaging





Last Impressions








Carotid Artery Ultrasound 12/29/17 0000 Signed





Impressions: 





 Service Date/Time:  Friday, December 29, 2017 08:18 - CONCLUSION:  No evidence 





 of flow-limiting carotid stenosis.      Jd Steward MD 


 


Scrotum Ultrasound 12/28/17 0000 Signed





Impressions: 





 Service Date/Time:  Thursday, December 28, 2017 21:57 - CONCLUSION:  Prominent 





 scrotal edema. Testicles are unremarkable     Jd Steward MD 


 


Head CT 12/28/17 0000 Signed





Impressions: 





 Service Date/Time:  Thursday, December 28, 2017 22:28 - CONCLUSION: 





 Age-indeterminate left thalamic lacunar infarct. No other acute intracranial 





 findings identified.     Henry Zepeda MD 


 


Chest X-Ray 12/28/17 0000 Signed





Impressions: 





 Service Date/Time:  Thursday, December 28, 2017 23:27 - CONCLUSION: No acute 





 cardiopulmonary disease identified.     Henry Zepeda MD 


 


Cervical Spine CT 12/28/17 0000 Signed





Impressions: 





 Service Date/Time:  Thursday, December 28, 2017 22:29 - CONCLUSION:  No 

evidence 





 of fracture. Multilevel degenerative findings.     Henry Zepeda MD 


 


Abdomen/Pelvis CT 12/28/17 0000 Signed





Impressions: 





 Service Date/Time:  Friday, December 29, 2017 02:42 - CONCLUSION:  1. Marked 





 scrotal skin thickening and scrotal edema. No gas identified in the soft 





 tissues.  2.  Elongated fluid density within the left side of the base of the 





 penis may represent early abscess formation. 3. Diffuse severe superficial 

soft 





 tissue edema of the lower abdomen, pelvis, and proximal lower extremities. 4. 





 Moderate bilateral hydronephrosis and diffuse hydroureter as well as marked 





 distention of urinary bladder. Findings suggest possible bladder outlet 





 obstruction. Prostate measures 4.1 x 2.9 cm in transverse dimensions.    

Henry Zepeda MD 








Objective Remarks


GENERAL: 68 yo  male, resting in BED NAD


HEAD: Atraumatic. Normocephalic. 


EYES: Pupils equal and round, 3 mm and reactive to 2 bilaterally.


ENT: No nasal bleeding or discharge.  Mucous membranes are dry.  There is 

thrush on his palate, buccal mucosa, gingiva. 


NECK: Trachea midline. No JVD. 


CARDIOVASCULAR: Regular rate and rhythm, no murmurs, rubs, gallops


RESPIRATORY: Breathing comfortably with no accessory muscle use or Kussmaul 

breathing.  Clear to auscultation bilaterally.  On room air.  No wheezes, Rales

, rhonchi.


GASTROINTESTINAL: Abdomen soft, nondistended, bowel sounds present, no rebound 

or guarding.  He does have faint erythema overlying lower abdomen and this 

extends up into the RLQ with edema there. The edematous area is tender.  There 

is no mass/fluctuance.  No CVAT. 


SKIN/SCROTUM:  warm, dry, poor skin turgor without rash.   The scrotum is 

diffusely swollen and the skin is excoriated and weeping serous fluid. No 

fluctuant mass appreciated. No necrotizing skin lesion noted. No perianal 

erythema. Penis -hard fluctuance left-sided


MUSCULOSKELETAL: Extremities are without edema. No obvious deformities. 


NEUROLOGICAL: Awake and alert. CN II-XII grossly intact.  Normal speech. 

Strength 5/5 all extremities.


Urinary Catheter:  Yes


Assessment to:  Continue


Bautista insert reason:  Measure Accurate Output


Date of Insertion:  Dec 29, 2017


Vascular Central Line Catheter:  Yes


Assessment to:  Continue


Date of Insertion:  Dec 29, 2017


Line:  Central Venous Catheter


Side:  Right


Location:  Internal, Jugular





A/P


Assessment and Plan


NEURO/PSYCH:





Status post mechanical fall


Age indeterminate Left thalamic lacunar infarct





CT brain -revealed an age-indeterminate left thalamic infarct.


CT Cs pine -degenerative changes noted.


Carotid Dopplers - no flow limiting stenosis


2-D echocardiogram pending


MRI/A brain ordered 12/30 a.m.


Lipid panel revealed a low cholesterol/HDL.





Currently on acetaminophen 650 mg every 6 hours when necessary fever/pain 1- 2.


Hydrocodone/acetaminophen 5/325 one tablet every 4 hours when necessary pain 3-5


Morphine sulfate 2 mg IV every 2 hours when necessary breakthrough pain


Will hold off baby aspirin 81 mg daily CVA history in possible surgical 

intervention this weekend





RESP:





Nasal cannula to maintain saturations greater than or equal to 92%


Incentive spirometry while awake


Chest x-ray 12/29 revealed no acute cardiopulmonary disease





CV:





Hypotension - severe sepsis


Lactic acidemia





Bolused 2 L normal saline in ED 


Currently normal saline at 250 cc an hour


Possible requiring vasopressors to maintain mean artery pressure greater than 

equal to 65


Unable to start ACE inhibitor secondary to low mean arterial pressure


Troponin 0.02


Pending 2-D echocardiogram


Lactic acid every 6 hours until cleared.  Last one documented 4.9





GI: 





Hypoalbuminemia


Elevated alkaline phosphatase





Initiate  2000 ADA diet


Famotidine 10 mg twice a day for GI prophylaxis


Docusate sodium/senna 1 tablet twice a day for bowel regimen





FEN/RENAL/: 





Pseudohyponatremia


Acute kidney injury


Bilateral hydronephrosis/hydroureter - SHINE


Penis fluid collection - possible early abscess





Scrotal ultrasound -normal testes.  No flow limiting effect noted.  Diffuse 

scrotal edema.


CT abdomen/pelvis - Marked scrotal skin thickening and scrotal edema. No gas 

identified in the soft tissues.  Elongated fluid density within the left side 

of the base of the penis 5.5 x 1 cm may represent early abscess formation.  

Diffuse severe superficial soft tissue edema of the lower abdomen, pelvis, and 

proximal lower extremities.  Moderate bilateral hydronephrosis and diffuse 

hydroureter as well as marked distention of urinary bladder. Findings suggest 

possible bladder outlet obstruction. Prostate measures 4.1 x 2.9 cm in 

transverse dimensions. 


Maintain 14 French coud catheter


Urology consult Dr. Foster recommends continue with Bautista catheter to gravity 

drainage until outpatient urologic workup can be completed.


   will eventually require cystoscopy and possibly a full urodynamics 

evaluation as an outpatient.


   repeat a CT scan of the abdomen and pelvis for reassessment on 12/31.


   Dr. Renteria on call this weekend repeat CT scan


Urine electrolytes/eosinophils pending


  


ID:





Oral thrush


Bacteriuria likely UTI


Scrotal cellulitis





Day #1 vancomycin, piperacillin/tazobactam and clindamycin for scrotal 

cellulitis/urinary tract infection


Fluconazole 100 mg IV x1.  Given 12/29


Clotrimazole 10 mg 5 times daily for oral thrush





Pertinent cultures





12/29 - blood cultures 2 - pending


12/29 - urine culture - pending





HEME: 





Leukocytosis - neutrophil predominant


Normocytic anemia





Monitor CBC daily.  Follow trends


Coags ordered





ENDO:





Hyperosmolar hyperglycemic nonketotic state





Discontinue insulin drip


Switch to Novulin R  sliding scale insulin Accu-Cheks before meals/at bedtime 

and O300


Continue aggressive hydration


Follow-up hemoglobin A1c


TSH 0.432





PROPH: 





SCD//Heparin subcutaneous for DVT prophylaxis.  H


Famotidine for stress ulcer prophylaxis.





ACCESS: Peripheral IV providing adequate access at this time.





Level II followup











Scar Leggett MD Dec 29, 2017 05:54

## 2017-12-29 NOTE — PD.CONS
\[facility] [place] [facility]\""


Service


Urology


Consult Requested By


Dr. Dupree


Reason for Consult


Scrotal cellulitis


Primary Care Physician


No Primary Care Physician


Diagnosis:  


History of Present Illness


67 year-old gentleman with no prior medical history who is now admitted with 

diabetic ketoacidosis.  Patient also complained of lower abdominal and scrotal 

pain with associated swelling that began approximately 2 days ago.  He denies 

trauma to the genitalia.  He denies a prior history of any abnormalities 

involving his genitalia.  He denies any significant past urologic history.  

Imaging studies include a scrotal ultrasound that was consistent with scrotal 

edema and no definitive abscess formation.  There was good blood flow to both 

testicles.  A subsequent CT scan of the abdomen and pelvis was performed that 

demonstrated bilateral hydroureteronephrosis with a markedly distended urinary 

bladder.  Also noted were some changes involving the scrotum consistent with 

the ultrasound findings as well as an elongated fluid density just to the left 

of the base of the penis of indeterminate etiology and may represent early 

abscess formation.  Patient did have a Bautista catheter placed and has been 

draining clear yellow urine.  His lower abdominal pain resolved after Bautista 

catheter was placed.  BUN/creatinine were mildly elevated.  Urinalysis was 

consistent with a UTI and a urine sample was submitted for culture and 

sensitivity with results pending.





Review of Systems


Constitutional:  COMPLAINS OF: Weight loss, Change in appetite, DENIES: Fever, 

Chills


Endocrine:  COMPLAINS OF: Polydipsia


Gastrointestinal:  COMPLAINS OF: Abdominal pain (lower abdomen)


Genitourinary:  COMPLAINS OF: Urinary frequency, Testicular Swelling, DENIES: 

Hematuria


Except as stated in HPI:  all other systems reviewed are Neg





Past Family Social History


Past Medical History


No prior significant past medical history


Past Surgical History


Denies


Reported Medications


Refer to EMR


Allergies:  


Coded Allergies:  


     No Known Allergies (Verified  Allergy, Mild, 10/5/07)


Active Ordered Medications


Refer to EMR


Family History


Mother with history diabetes mellitus


Father with history MI


Social History


Denies history tobacco, alcohol or intravenous drug abuse





Physical Exam





Vital Signs








  Date Time  Temp Pulse Resp B/P (MAP) Pulse Ox O2 Delivery O2 Flow Rate FiO2


 


12/29/17 12:00 97.7 85 19 97/53 (68) 99   


 


12/29/17 12:00  85      


 


12/29/17 10:00  84      


 


12/29/17 08:00  84      


 


12/29/17 08:00 98.6 84 24 95/54 (68) 97   


 


12/29/17 06:00  91      


 


12/29/17 04:00  90      


 


12/29/17 03:20 97.6 92 20 131/69 (89) 95   


 


12/29/17 01:00  94 18 136/57 (83) 95 Room Air  


 


12/28/17 22:00  88 18 135/65 (88) 94 Room Air  


 


12/28/17 21:46     98 Room Air  


 


12/28/17 21:09 97.8 101 16 142/65 (90) 97 Room Air  








Physical Exam


GENERAL: This is a well-nourished, well-developed patient, in no apparent 

distress.


SKIN: No rashes, ecchymoses or lesions. Cool and dry.


HEAD: Atraumatic. Normocephalic. No temporal or scalp tenderness.


EYES: Pupils equal round and reactive. Extraocular motions intact. No scleral 

icterus. No injection or drainage. 


ENT: Nose without bleeding, purulent drainage or septal hematoma. Throat 

without erythema, tonsillar hypertrophy or exudate. Uvula midline. Airway 

patent.


NECK: Trachea midline. No JVD or lymphadenopathy. Supple, nontender, no 

meningeal signs.


GASTROINTESTINAL: Abdomen soft, non-tender, nondistended. No hepato-splenomegaly

, or palpable masses. No guarding.


GENITOURINARY: Bladder not distended.  Bautista catheter in place draining clear 

yellow urine.


MUSCULOSKELETAL: Extremities without clubbing, cyanosis, or edema. No joint 

tenderness, effusion, or edema noted. No calf tenderness. Negative Homans sign 

bilaterally.


NEUROLOGICAL: Awake and alert. Cranial nerves II through XII intact.  Motor and 

sensory grossly within normal limits. Five out of 5 muscle strength in all 

muscle groups.  Normal speech.


Lab results reviewed:  Yes





Laboratory Tests








Test


  12/28/17


21:19 12/28/17


21:28 12/29/17


00:04 12/29/17


00:15


 


Blood Gas Puncture Site     


 


Blood Gas Patient Temperature 98.6    


 


Venous Blood pH 7.28    


 


Venous Blood Partial Pressure


CO2 30 


  


  


  


 


 


Venous Blood Partial Pressure


O2 43 


  


  


  


 


 


Venous Blood HCO3 14    


 


Venous Blood Oxygen Saturation 69    


 


Venous Blood Oxygen Content 11.8    


 


Venous Blood Base Excess -11.9    


 


Blood Gas Inspired Oxygen 21    


 


White Blood Count  15.9   


 


Red Blood Count  4.01   


 


Hemoglobin  12.1   


 


Hematocrit  39.0   


 


Mean Corpuscular Volume  97.3   


 


Mean Corpuscular Hemoglobin  30.2   


 


Mean Corpuscular Hemoglobin


Concent 


  31.0 


  


  


 


 


Red Cell Distribution Width  12.5   


 


Platelet Count  216   


 


Mean Platelet Volume  10.7   


 


Neutrophils (%) (Auto)  93.8   


 


Lymphocytes (%) (Auto)  2.9   


 


Monocytes (%) (Auto)  3.2   


 


Eosinophils (%) (Auto)  0.0   


 


Basophils (%) (Auto)  0.1   


 


Neutrophils # (Auto)  14.9   


 


Lymphocytes # (Auto)  0.5   


 


Monocytes # (Auto)  0.5   


 


Eosinophils # (Auto)  0.0   


 


Basophils # (Auto)  0.0   


 


CBC Comment  DIFF FINAL   


 


Differential Comment     


 


Blood Urea Nitrogen  55   


 


Creatinine  3.14   


 


Random Glucose  1618   


 


Total Protein  5.8   


 


Albumin  2.2   


 


Calcium Level  7.7   


 


Magnesium Level  1.8   


 


Alkaline Phosphatase  188   


 


Aspartate Amino Transf


(AST/SGOT) 


  21 


  


  


 


 


Alanine Aminotransferase


(ALT/SGPT) 


  14 


  


  


 


 


Total Bilirubin  0.6   


 


Sodium Level  107   


 


Potassium Level  5.9   


 


Chloride Level  77   


 


Carbon Dioxide Level  12.8   


 


Anion Gap  17   


 


Estimat Glomerular Filtration


Rate 


  20 


  


  


 


 


Total Creatine Kinase  224   


 


B-Type Natriuretic Peptide  468   


 


Lipase  152   


 


B-Hydroxybutyrate  0.39   


 


Lactic Acid Level   3.4  


 


Serum Osmolality    338 


 


Test


  12/29/17


01:24 12/29/17


02:20 12/29/17


03:30 12/29/17


03:37


 


Troponin I 0.03    


 


Lactic Acid Level  4.9   


 


Nasal Screen MRSA (PCR)


  


  


  MRSA NOT


DETECTED 


 


 


Blood Urea Nitrogen    60 


 


Creatinine    3.04 


 


Random Glucose    1168 


 


Calcium Level    7.6 


 


Phosphorus Level    2.5 


 


Magnesium Level    1.8 


 


Sodium Level    118 


 


Potassium Level    5.1 


 


Chloride Level    89 


 


Carbon Dioxide Level    13.9 


 


Anion Gap    15 


 


Estimat Glomerular Filtration


Rate 


  


  


  21 


 


 


Test


  12/29/17


05:03 12/29/17


07:30 12/29/17


10:35 


 


 


Random Glucose 1031  756  470  


 


Urine Color   LIGHT-RED  


 


Urine Turbidity   CLOUDY  


 


Urine pH   5.0  


 


Urine Specific Gravity   1.022  


 


Urine Protein   30  


 


Urine Glucose (UA)   1000  


 


Urine Ketones   NEG  


 


Urine Occult Blood   LARGE  


 


Urine Nitrite   NEG  


 


Urine Bilirubin   NEG  


 


Urine Urobilinogen   LESS THAN 2.0  


 


Urine Leukocyte Esterase   LARGE  


 


Urine RBC     


 


Urine WBC     


 


Urine WBC Clumps   MANY  


 


Urine Squamous Epithelial


Cells 


  


  1 


  


 


 


Urine Bacteria   MOD  


 


Microscopic Urinalysis Comment


  


  


  CULTURE


INDICATED 


 


 


Blood Urea Nitrogen   59  


 


Creatinine   2.41  


 


Calcium Level   8.1  


 


Phosphorus Level   2.9  


 


Magnesium Level   1.8  


 


Sodium Level   126  


 


Potassium Level   4.9  


 


Chloride Level   96  


 


Carbon Dioxide Level   17.3  


 


Anion Gap   13  


 


Estimat Glomerular Filtration


Rate 


  


  27 


  


 


 


Troponin I   0.02  


 


Triglycerides Level   77  


 


Cholesterol Level   58  


 


LDL Cholesterol   19  


 


HDL Cholesterol   23.2  


 


Cholesterol/HDL Ratio   2.50  


 


Thyroid Stimulating Hormone


3rd Gen 


  


  0.432 


  


 


 


B-Hydroxybutyrate   0.05  














 Date/Time


Source Procedure


Growth Status


 


 


 12/29/17 01:25


Blood Peripheral Aerobic Blood Culture


Pending Received


 


 12/29/17 01:25


Blood Peripheral Anaerobic Blood Culture


Pending Received


 


 12/29/17 10:35


Urine Clean Catch Urine Culture


Pending Received








Result Diagram:  


12/28/17 2128 12/29/17 1035





Personally reviewed images:  Yes


Imaging





Last Impressions








Chest X-Ray 12/29/17 0927 Signed





Impressions: 





 Service Date/Time:  Friday, December 29, 2017 09:31 - CONCLUSION:  MD 


 


Carotid Artery Ultrasound 12/29/17 0000 Signed





Impressions: 





 Service Date/Time:  Friday, December 29, 2017 08:18 - CONCLUSION:  No evidence 





 of flow-limiting carotid stenosis.      Jd Steward MD 


 


Scrotum Ultrasound 12/28/17 0000 Signed





Impressions: 





 Service Date/Time:  Thursday, December 28, 2017 21:57 - CONCLUSION:  Prominent 





 scrotal edema. Testicles are unremarkable     Jd Steward MD 


 


Head CT 12/28/17 0000 Signed





Impressions: 





 Service Date/Time:  Thursday, December 28, 2017 22:28 - CONCLUSION: 





 Age-indeterminate left thalamic lacunar infarct. No other acute intracranial 





 findings identified.     Henry Zepeda MD 


 


Cervical Spine CT 12/28/17 0000 Signed





Impressions: 





 Service Date/Time:  Thursday, December 28, 2017 22:29 - CONCLUSION:  No 

evidence 





 of fracture. Multilevel degenerative findings.     Henry Zepeda MD 


 


Abdomen/Pelvis CT 12/28/17 0000 Signed





Impressions: 





 Service Date/Time:  Friday, December 29, 2017 02:42 - CONCLUSION:  1. Marked 





 scrotal skin thickening and scrotal edema. No gas identified in the soft 





 tissues.  2.  Elongated fluid density within the left side of the base of the 





 penis may represent early abscess formation. 3. Diffuse severe superficial 

soft 





 tissue edema of the lower abdomen, pelvis, and proximal lower extremities. 4. 





 Moderate bilateral hydronephrosis and diffuse hydroureter as well as marked 





 distention of urinary bladder. Findings suggest possible bladder outlet 





 obstruction. Prostate measures 4.1 x 2.9 cm in transverse dimensions.    

Henry Zepeda MD 











Assessment and Plan


Assessment and Plan


Urologic impression:


#1 urinary retention of indeterminate etiology, rule out bladder outlet 

obstruction versus neurogenic bladder dysfunction


#2 bilateral hydroureteronephrosis related to urinary retention


#3 scrotal cellulitis with possible early abscess formation involving left base 

of penis





Recommendations:


#1 continue with Bautista catheter to gravity drainage until outpatient urologic 

workup can be completed.


#2 will eventually require cystoscopy and possibly a full urodynamics 

evaluation as an outpatient.


#3 agree with present antibiotics for scrotal cellulitis


#4 repeat a CT scan of the abdomen and pelvis for reassessment


#5 Dr. Renteria on call this weekend











Zay Foster MD Dec 29, 2017 13:08

## 2017-12-29 NOTE — RADRPT
EXAM DATE/TIME:  12/29/2017 09:31 

 

[facility] COMPARISON:     

CHEST SINGLE AP, December 28, 2017, 23:27.

 

                     

INDICATIONS :     

Central line placement.

                     

 

MEDICAL HISTORY :     

None.          

 

SURGICAL HISTORY :     

None.   

 

ENCOUNTER:     

Initial                                        

 

ACUITY:     

1 day      

 

PAIN SCORE:     

Non-responsive.

 

LOCATION:     

Bilateral chest 

 

FINDINGS:     

A single view of the chest demonstrates the lungs to be symmetrically aerated without evidence of mas
s, infiltrate or effusion.  The cardiomediastinal contours are unremarkable.  Osseous structures are 
intact. There is minimal placement of a right internal jugular central venous line with the tip proje
cted over the superior vena cava. There is no pneumothorax. The patient is rotated to the left.

 

CONCLUSION;  Interval Placement of right internal jugular central venous line with no pneumothorax.

 

 

 

 Marcos Wilkerson MD on December 29, 2017 at 9:56           

Board Certified Radiologist.

 This report was verified electronically.

## 2017-12-30 VITALS
SYSTOLIC BLOOD PRESSURE: 115 MMHG | TEMPERATURE: 98.5 F | OXYGEN SATURATION: 97 % | DIASTOLIC BLOOD PRESSURE: 64 MMHG | RESPIRATION RATE: 30 BRPM | HEART RATE: 94 BPM

## 2017-12-30 VITALS — HEART RATE: 96 BPM

## 2017-12-30 VITALS
RESPIRATION RATE: 16 BRPM | DIASTOLIC BLOOD PRESSURE: 70 MMHG | HEART RATE: 92 BPM | SYSTOLIC BLOOD PRESSURE: 131 MMHG | OXYGEN SATURATION: 96 % | TEMPERATURE: 98.4 F

## 2017-12-30 VITALS
SYSTOLIC BLOOD PRESSURE: 121 MMHG | HEART RATE: 92 BPM | RESPIRATION RATE: 16 BRPM | DIASTOLIC BLOOD PRESSURE: 62 MMHG | OXYGEN SATURATION: 95 % | TEMPERATURE: 98.4 F

## 2017-12-30 VITALS
TEMPERATURE: 98.1 F | OXYGEN SATURATION: 97 % | RESPIRATION RATE: 35 BRPM | HEART RATE: 104 BPM | DIASTOLIC BLOOD PRESSURE: 62 MMHG | SYSTOLIC BLOOD PRESSURE: 120 MMHG

## 2017-12-30 VITALS
OXYGEN SATURATION: 98 % | SYSTOLIC BLOOD PRESSURE: 123 MMHG | HEART RATE: 91 BPM | DIASTOLIC BLOOD PRESSURE: 59 MMHG | RESPIRATION RATE: 32 BRPM

## 2017-12-30 VITALS — HEART RATE: 101 BPM

## 2017-12-30 VITALS
OXYGEN SATURATION: 96 % | SYSTOLIC BLOOD PRESSURE: 103 MMHG | DIASTOLIC BLOOD PRESSURE: 59 MMHG | HEART RATE: 90 BPM | RESPIRATION RATE: 29 BRPM | TEMPERATURE: 97.5 F

## 2017-12-30 VITALS — HEART RATE: 91 BPM

## 2017-12-30 VITALS — HEART RATE: 92 BPM

## 2017-12-30 VITALS — HEART RATE: 100 BPM

## 2017-12-30 VITALS — HEART RATE: 95 BPM

## 2017-12-30 VITALS — OXYGEN SATURATION: 98 %

## 2017-12-30 VITALS — HEART RATE: 87 BPM

## 2017-12-30 VITALS — HEART RATE: 93 BPM

## 2017-12-30 VITALS — HEART RATE: 86 BPM

## 2017-12-30 LAB
ALBUMIN SERPL-MCNC: 1.6 GM/DL (ref 3.4–5)
ALP SERPL-CCNC: 84 U/L (ref 45–117)
ALT SERPL-CCNC: 15 U/L (ref 12–78)
AMYLASE SERPL-CCNC: 124 U/L (ref 25–115)
AST SERPL-CCNC: 14 U/L (ref 15–37)
BASOPHILS # BLD AUTO: 0 TH/MM3 (ref 0–0.2)
BASOPHILS NFR BLD: 0 % (ref 0–2)
BILIRUB INDIRECT SERPL-MCNC: 0.2 MG/DL (ref 0–0.8)
BILIRUB SERPL-MCNC: 0.4 MG/DL (ref 0.2–1)
BUN SERPL-MCNC: 39 MG/DL (ref 7–18)
CALCIUM SERPL-MCNC: 7.7 MG/DL (ref 8.5–10.1)
CHLORIDE SERPL-SCNC: 104 MEQ/L (ref 98–107)
CREAT SERPL-MCNC: 1.13 MG/DL (ref 0.6–1.3)
DIRECT BILIRUBIN ADULT: 0.2 MG/DL (ref 0–0.2)
EOSINOPHIL # BLD: 0 TH/MM3 (ref 0–0.4)
EOSINOPHIL NFR BLD: 0.1 % (ref 0–4)
ERYTHROCYTE [DISTWIDTH] IN BLOOD BY AUTOMATED COUNT: 12.3 % (ref 11.6–17.2)
FIBRINOGEN PPP-MCNC: 576 MG/DL (ref 227–377)
GFR SERPLBLD BASED ON 1.73 SQ M-ARVRAT: 65 ML/MIN (ref 89–?)
GLUCOSE SERPL-MCNC: 201 MG/DL (ref 74–106)
HCO3 BLD-SCNC: 18.9 MEQ/L (ref 21–32)
HCT VFR BLD CALC: 33.4 % (ref 39–51)
HGB BLD-MCNC: 11.8 GM/DL (ref 13–17)
LIPASE: 48 U/L (ref 73–393)
LYMPHOCYTES # BLD AUTO: 0.8 TH/MM3 (ref 1–4.8)
LYMPHOCYTES NFR BLD AUTO: 4.6 % (ref 9–44)
MAGNESIUM SERPL-MCNC: 2.3 MG/DL (ref 1.5–2.5)
MCH RBC QN AUTO: 30 PG (ref 27–34)
MCHC RBC AUTO-ENTMCNC: 35.2 % (ref 32–36)
MCV RBC AUTO: 85.2 FL (ref 80–100)
MONOCYTE #: 0.5 TH/MM3 (ref 0–0.9)
MONOCYTES NFR BLD: 2.8 % (ref 0–8)
NEUTROPHILS # BLD AUTO: 16.8 TH/MM3 (ref 1.8–7.7)
NEUTROPHILS NFR BLD AUTO: 92.5 % (ref 16–70)
PHOSPHATE SERPL-MCNC: 2.2 MG/DL (ref 2.5–4.9)
PLATELET # BLD: 166 TH/MM3 (ref 150–450)
PMV BLD AUTO: 9 FL (ref 7–11)
PROT SERPL-MCNC: 5.1 GM/DL (ref 6.4–8.2)
RANDOM VANCOMYCIN: 7.6 COMMENT
RBC # BLD AUTO: 3.92 MIL/MM3 (ref 4.5–5.9)
SODIUM SERPL-SCNC: 133 MEQ/L (ref 136–145)
WBC # BLD AUTO: 18.1 TH/MM3 (ref 4–11)

## 2017-12-30 RX ADMIN — Medication SCH ML: at 20:17

## 2017-12-30 RX ADMIN — HUMAN INSULIN SCH: 100 INJECTION, SOLUTION SUBCUTANEOUS at 08:00

## 2017-12-30 RX ADMIN — TAZOBACTAM SODIUM AND PIPERACILLIN SODIUM SCH MLS/HR: 250; 2 INJECTION, SOLUTION INTRAVENOUS at 14:00

## 2017-12-30 RX ADMIN — CLINDAMYCIN PHOSPHATE SCH MLS/HR: 600 INJECTION, SOLUTION INTRAVENOUS at 09:52

## 2017-12-30 RX ADMIN — PHENYTOIN SODIUM SCH MLS/HR: 50 INJECTION INTRAMUSCULAR; INTRAVENOUS at 09:58

## 2017-12-30 RX ADMIN — HUMAN INSULIN SCH: 100 INJECTION, SOLUTION SUBCUTANEOUS at 12:00

## 2017-12-30 RX ADMIN — CLOTRIMAZOLE SCH MG: 10 LOZENGE ORAL at 05:25

## 2017-12-30 RX ADMIN — HUMAN INSULIN SCH: 100 INJECTION, SOLUTION SUBCUTANEOUS at 20:17

## 2017-12-30 RX ADMIN — CHLORHEXIDINE GLUCONATE SCH PACK: 500 CLOTH TOPICAL at 03:40

## 2017-12-30 RX ADMIN — PHENYTOIN SODIUM SCH MLS/HR: 50 INJECTION INTRAMUSCULAR; INTRAVENOUS at 05:25

## 2017-12-30 RX ADMIN — PHENYTOIN SODIUM SCH MLS/HR: 50 INJECTION INTRAMUSCULAR; INTRAVENOUS at 03:39

## 2017-12-30 RX ADMIN — FAMOTIDINE SCH MG: 20 TABLET, FILM COATED ORAL at 20:17

## 2017-12-30 RX ADMIN — CLOTRIMAZOLE SCH MG: 10 LOZENGE ORAL at 20:16

## 2017-12-30 RX ADMIN — STANDARDIZED SENNA CONCENTRATE AND DOCUSATE SODIUM SCH TAB: 8.6; 5 TABLET, FILM COATED ORAL at 20:16

## 2017-12-30 RX ADMIN — TAZOBACTAM SODIUM AND PIPERACILLIN SODIUM SCH MLS/HR: 250; 2 INJECTION, SOLUTION INTRAVENOUS at 09:54

## 2017-12-30 RX ADMIN — Medication SCH ML: at 09:00

## 2017-12-30 RX ADMIN — CLOTRIMAZOLE SCH MG: 10 LOZENGE ORAL at 14:00

## 2017-12-30 RX ADMIN — HEPARIN SODIUM SCH UNITS: 10000 INJECTION, SOLUTION INTRAVENOUS; SUBCUTANEOUS at 14:00

## 2017-12-30 RX ADMIN — CLINDAMYCIN PHOSPHATE SCH MLS/HR: 600 INJECTION, SOLUTION INTRAVENOUS at 16:00

## 2017-12-30 RX ADMIN — HUMAN INSULIN SCH: 100 INJECTION, SOLUTION SUBCUTANEOUS at 17:00

## 2017-12-30 RX ADMIN — MORPHINE SULFATE PRN MG: 2 INJECTION, SOLUTION INTRAMUSCULAR; INTRAVENOUS at 06:17

## 2017-12-30 RX ADMIN — CLOTRIMAZOLE SCH MG: 10 LOZENGE ORAL at 09:58

## 2017-12-30 RX ADMIN — HEPARIN SODIUM SCH UNITS: 10000 INJECTION, SOLUTION INTRAVENOUS; SUBCUTANEOUS at 05:25

## 2017-12-30 RX ADMIN — TAZOBACTAM SODIUM AND PIPERACILLIN SODIUM SCH MLS/HR: 250; 2 INJECTION, SOLUTION INTRAVENOUS at 03:38

## 2017-12-30 RX ADMIN — HUMAN INSULIN SCH: 100 INJECTION, SOLUTION SUBCUTANEOUS at 21:00

## 2017-12-30 RX ADMIN — FAMOTIDINE SCH MG: 20 TABLET, FILM COATED ORAL at 09:59

## 2017-12-30 RX ADMIN — CLOTRIMAZOLE SCH MG: 10 LOZENGE ORAL at 16:15

## 2017-12-30 RX ADMIN — VANCOMYCIN HYDROCHLORIDE SCH MLS/HR: 1 INJECTION, SOLUTION INTRAVENOUS at 09:00

## 2017-12-30 RX ADMIN — HUMAN INSULIN SCH: 100 INJECTION, SOLUTION SUBCUTANEOUS at 03:39

## 2017-12-30 RX ADMIN — TAZOBACTAM SODIUM AND PIPERACILLIN SODIUM SCH MLS/HR: 250; 2 INJECTION, SOLUTION INTRAVENOUS at 20:16

## 2017-12-30 RX ADMIN — STANDARDIZED SENNA CONCENTRATE AND DOCUSATE SODIUM SCH TAB: 8.6; 5 TABLET, FILM COATED ORAL at 09:00

## 2017-12-30 RX ADMIN — HEPARIN SODIUM SCH UNITS: 10000 INJECTION, SOLUTION INTRAVENOUS; SUBCUTANEOUS at 20:17

## 2017-12-30 NOTE — HHI.CCPN
Subjective


Remarks/Hospital Course


66 yo WM with no significant known medical history though he has not been 

followed by a physician who presents to [facility] emergency 

department complaining of two-week history of excessive thirst, weight loss, 

decreased appetite, and feeling weak.  Today he slipped and fell and hit his 

head on the tile floor.  His wife states no loss of consciousness.  Head CT 

showed no acute traumatic injury.  There is an age-indeterminate left thalamic 

infarct.  C-spine was negative.  He has hyperosmolar hyperglycemic state with 

glucose greater than 1600.  Beta hydroxybutyrate is normal.  Anion gap is 17.  

Potassium is 5.9.  He also has severe scrotal swelling and pain.  He states 

this is been going on for 2 days.  He has had urinary incontinence, dysuria and 

so the scrotal skin has remained wet and the skin is weeping and tender.   Now 

he states unable to void.  He denies flank pain, vomiting, diarrhea, fever, 

cough.  He has received 1 L normal saline bolus and insulin 6 units IV bolus in 

the emergency department.  Vancomycin and Zosyn has been ordered.  Ultrasound 

of the scrotum showed edema, normal flow to testicle.





12/29: Patient is mean arterial pressures 60-70 past few hours.  Received 2 L 

bolus normal saline currently on normal saline at 250 cc an hour.  Central line 

placed this a.m. for lab draws/impending need for vasopressors.  Patient is 

arousable and follows simple commands.  Remains lethargic.  Did receive 

midazolam for Bautista placement this a.m. 2 mg








SUBJECTIVE:


12/30 WBC up to 18. Afebrile. No pressors. Evaluated by speech therapy and okay 

for pureed diet. Alert but still confused. MRI planned for today.  Did not 

require vasopressors





Objective





Vital Signs








  Date Time  Temp Pulse Resp B/P (MAP) Pulse Ox O2 Delivery O2 Flow Rate FiO2


 


12/30/17 06:00  91      


 


12/30/17 04:00   32 123/59 (80) 98   


 


12/30/17 00:00 97.5       


 


12/29/17 19:31      Nasal Cannula 2.00 














Intake and Output   


 


 12/30/17 12/30/17 12/31/17





 08:00 16:00 00:00


 


Intake Total 2429 ml  


 


Output Total 1700 ml  


 


Balance 729 ml  








Result Diagram:  


12/30/17 0355                                                                  

              12/30/17 0355





Imaging





Last Impressions








Carotid Artery Ultrasound 12/29/17 0000 Signed





Impressions: 





 Service Date/Time:  Friday, December 29, 2017 08:18 - CONCLUSION:  No evidence 





 of flow-limiting carotid stenosis.      Jd Steward MD 


 


Scrotum Ultrasound 12/28/17 0000 Signed





Impressions: 





 Service Date/Time:  Thursday, December 28, 2017 21:57 - CONCLUSION:  Prominent 





 scrotal edema. Testicles are unremarkable     Jd Steward MD 


 


Head CT 12/28/17 0000 Signed





Impressions: 





 Service Date/Time:  Thursday, December 28, 2017 22:28 - CONCLUSION: 





 Age-indeterminate left thalamic lacunar infarct. No other acute intracranial 





 findings identified.     Henry Zepeda MD 


 


Chest X-Ray 12/28/17 0000 Signed





Impressions: 





 Service Date/Time:  Thursday, December 28, 2017 23:27 - CONCLUSION: No acute 





 cardiopulmonary disease identified.     Henry Zepeda MD 


 


Cervical Spine CT 12/28/17 0000 Signed





Impressions: 





 Service Date/Time:  Thursday, December 28, 2017 22:29 - CONCLUSION:  No 

evidence 





 of fracture. Multilevel degenerative findings.     Henry Zepeda MD 


 


Abdomen/Pelvis CT 12/28/17 0000 Signed





Impressions: 





 Service Date/Time:  Friday, December 29, 2017 02:42 - CONCLUSION:  1. Marked 





 scrotal skin thickening and scrotal edema. No gas identified in the soft 





 tissues.  2.  Elongated fluid density within the left side of the base of the 





 penis may represent early abscess formation. 3. Diffuse severe superficial 

soft 





 tissue edema of the lower abdomen, pelvis, and proximal lower extremities. 4. 





 Moderate bilateral hydronephrosis and diffuse hydroureter as well as marked 





 distention of urinary bladder. Findings suggest possible bladder outlet 





 obstruction. Prostate measures 4.1 x 2.9 cm in transverse dimensions.    

Henry Zepeda MD 








Objective Remarks


GENERAL: 66 yo  male, resting in bed. 


HEAD: Atraumatic. Normocephalic. 


EYES: Pupils equal and round, 3 mm and reactive to 2 bilaterally.


ENT: No nasal bleeding or discharge.  Mucous membranes moist .  There is thrush 

on his palate, buccal mucosa, gingiva. 


NECK: Trachea midline. No JVD. 


CARDIOVASCULAR: Regular rate and rhythm, no murmurs, rubs, gallops


RESPIRATORY: Breathing comfortably with no accessory muscle use.  Clear to 

auscultation bilaterally.  On NC  No wheezes, Rales, rhonchi.


GASTROINTESTINAL: Abdomen soft, nondistended, bowel sounds present, no rebound 

or guarding.  He has edema overlying lower abdomen/pelvis and this extends up 

into the RLQ with edema there. The erythema that was once there is improved.  

The edematous area is tender.  There is no mass/fluctuance.  No CVAT. 


SKIN/SCROTUM:  warm, dry, poor skin turgor without rash.   The scrotum is 

diffusely swollen and the skin is excoriated and weeping serous fluid. No 

fluctuant mass appreciated. No necrotizing skin lesion noted. No perianal 

erythema. Penis - firm , left sided fluctuance. 


MUSCULOSKELETAL: There is mild dependent edema of right upper thigh.  No 

obvious deformities. 


NEUROLOGICAL: Awake and alert., confused.  Normal speech.  Moves all 

extremities without focal deficit.


Date of Insertion:  Dec 29, 2017


Date of Insertion:  Dec 29, 2017


Line:  Central Venous Catheter


Side:  Right


Location:  Internal, Jugular





A/P


Problem List:  


(1) Type 2 diabetes mellitus with hyperosmolar nonketotic hyperglycemia


ICD Code:  E11.01 - Type 2 diabetes mellitus with hyperosmolarity with coma


Status:  Chronic


(2) GRACIELA (acute kidney injury)


ICD Code:  N17.9 - Acute kidney failure, unspecified


Status:  Acute


(3) Lactic acidemia


ICD Code:  E87.2 - Acidosis


Status:  Acute


(4) Closed head injury


ICD Code:  S09.90XA - Unspecified injury of head, initial encounter


Status:  Acute


(5) Leukocytosis


ICD Code:  D72.829 - Elevated white blood cell count, unspecified


Status:  Acute


(6) Cellulitis of scrotum


ICD Code:  N49.2 - Inflammatory disorders of scrotum


Status:  Acute


(7) Fall


ICD Code:  W19.XXXA - Unspecified fall, initial encounter


Status:  Acute


(8) Lacunar stroke


ICD Code:  I63.9 - Cerebral infarction, unspecified


Status:  Chronic


(9) Urinary retention


ICD Code:  R33.9 - Retention of urine, unspecified


Status:  Acute


(10) Hydronephrosis


ICD Code:  N13.30 - Unspecified hydronephrosis


Status:  Acute


(11) Thrush


ICD Code:  B37.0 - Candidal stomatitis


Status:  Acute


(12) Severe sepsis


ICD Code:  A41.9 - Sepsis, unspecified organism; R65.20 - Severe sepsis without 

septic shock


Assessment and Plan


NEURO/PSYCH:





Status post mechanical fall


Age indeterminate Left thalamic lacunar infarct





CT brain -revealed an age-indeterminate left thalamic infarct.


CT Cspine -degenerative changes noted.


Carotid Dopplers - no flow limiting stenosis


2-D echo no evidence of embolic source


MRI/A brain ordered 12/30 a.m.


Lipid panel revealed a low cholesterol/HDL.





Currently on acetaminophen 650 mg every 6 hours when necessary fever/pain 1- 2.


Hydrocodone/acetaminophen 5/325 one tablet every 4 hours when necessary pain 3-5


Morphine sulfate 2 mg IV every 2 hours when necessary breakthrough pain


baby aspirin has been held for possible surgical intervention this weekend





RESP:





Nasal cannula to maintain saturations greater than or equal to 92%


Incentive spirometry while awake


Chest x-ray 12/29 revealed no acute cardiopulmonary disease





CV:





Hypotension - severe sepsis


Lactic acidemia





Bolused 2 L normal saline in ED 


KVO IV fluids.


Not requiring vasopressors.


Unable to start ACE inhibitor secondary to low mean arterial pressure


Troponin 0.02


2-D echocardiogram - EF 60-65%.  Pulmonary artery pressure 35 mmHg.  Small 

pericardial effusion without hemodynamic compromise.


Lactic acid in a.m.





GI: 





Hypoalbuminemia


Elevated alkaline phosphatase


 2000 ADA diet, pureed per speech therapy recommendations.


Famotidine 10 mg twice a day for GI prophylaxis


Docusate sodium/senna 1 tablet twice a day for bowel regimen





FEN/RENAL/: 





Pseudohyponatremia


Acute kidney injury


Bilateral hydronephrosis/hydroureter -


Penis fluid collection - possible early abscess





Scrotal ultrasound -normal testes.  No flow limiting effect noted.  Diffuse 

scrotal edema.


CT abdomen/pelvis - Marked scrotal skin thickening and scrotal edema. No gas 

identified in the soft tissues.  Elongated fluid density within the left side 

of the base of the penis 5.5 x 1 cm may represent early abscess formation.  

Diffuse severe superficial soft tissue edema of the lower abdomen, pelvis, and 

proximal lower extremities.  Moderate bilateral hydronephrosis and diffuse 

hydroureter as well as marked distention of urinary bladder. Findings suggest 

possible bladder outlet obstruction. Prostate measures 4.1 x 2.9 cm in 

transverse dimensions. 


Maintain 14 Vietnamese coud catheter


Urology consult Dr. Foster recommends continue with Bautista catheter to gravity 

drainage until outpatient urologic workup can be completed.


   will eventually require cystoscopy and possibly a full urodynamics 

evaluation as an outpatient.


   repeat a CT scan of the abdomen and pelvis for reassessment on 12/31.


   Dr. Renteria following this weekend 


Urine eosinophils 1-2


  


ID:





Oral thrush


Bacteriuria likely UTI


Scrotal cellulitis





Day #3 vancomycin, piperacillin/tazobactam and clindamycin for scrotal 

cellulitis/urinary tract infection


Fluconazole 100 mg IV x1.  Given 12/29


Clotrimazole 10 mg 5 times daily for oral thrush





Pertinent cultures





12/29 - blood cultures 2 - neg


12/29 - urine culture - neg


HEME: 





Leukocytosis - neutrophil predominant


Normocytic anemia





Monitor CBC daily.  Follow trends


Coags ordered





ENDO:





Hyperosmolar hyperglycemic nonketotic state





off  insulin drip


Remains hyperglycemic on low dose sliding scale.  Will increase to medium dose 

with meals and at bedtime.





Follow-up hemoglobin A1c


TSH 0.432





PROPH: 





SCD//Heparin subcutaneous for DVT prophylaxis


Famotidine for stress ulcer prophylaxis.





ACCESS: Peripheral IV providing adequate access at this time.





Level II followup





Problem Qualifiers





(1) Closed head injury:  


Qualified Codes:  S09.90XA - Unspecified injury of head, initial encounter


(2) Fall:  


Qualified Codes:  W19.XXXA - Unspecified fall, initial encounter








Kimberly Dupree MD Dec 30, 2017 14:26

## 2017-12-30 NOTE — RADRPT
EXAM DATE/TIME:  12/30/2017 21:09 

 

[facility] COMPARISON:     

MRI BRAIN W/O CONTRAST, December 30, 2017, 21:09.

       

 

 

INDICATIONS :     

Altered mental status.

                     

 

MEDICAL HISTORY :     

None.     

 

SURGICAL HISTORY :     

None.     

 

ENCOUNTER:     

Initial

 

ACUITY:     

1 day

 

PAIN SCORE:     

0/10

 

LOCATION:       

cranial 

 

Please note a normal MRA of the brain does not entirely exclude the possibility of a small aneurysm, 


nor the possibility of distal intracranial vessel disease.

 

TECHNIQUE:     

3D time of flight MRA was performed.  Source images, multiplanar STS MIP, and 3D volume MIP reconstru
ctions were reviewed.

 

FINDINGS:     

No significant vascular malformations, vessel truncation or aneurysmal dilatations are seen except fo
r slight atherosclerotic changes involving multiple branches bilaterally mainly the MCAs.

 

CONCLUSION:     Slight atherosclerotic changes of distal branches bilaterally, otherwise unremarkable
.

 

 

 ROBBY Mack MD on December 30, 2017 at 21:41           

Board Certified Radiologist.

 This report was verified electronically.

## 2017-12-30 NOTE — RADRPT
EXAM DATE/TIME:  12/30/2017 21:09 

 

[facility] COMPARISON:     

CT BRAIN W/O CONTRAST, December 28, 2017, 22:28.  MRA BRAIN W/O CONTRAST, December 30, 2017, 21:09.

       

 

 

INDICATIONS :     

Altered mental status.

                     

 

MEDICAL HISTORY :     

None.     

 

SURGICAL HISTORY :     

None.     

 

ENCOUNTER:     

Initial

 

ACUITY:     

1 day

 

PAIN SCORE:     

0/10

 

LOCATION:       

cranial 

 

TECHNIQUE:     

Multiplanar, multisequence MRI of the brain was performed without contrast.

 

FINDINGS:     

There is no evidence for intracranial hemorrhage, mass effect, mass lesions, edema, or extra-axial fl
uid collections. On the diffusion portion there is a small 4-5 mm bright signal questionable for a ti
ny acute infarction without hemorrhage or mass effect. Slight degree of brain atrophy is seen. There 
is mild prominence of the extra-axial CSF due to atrophic changes.

 

CONCLUSION:     Possible tiny subcentimeter area of acute infarction left posterior temporal lobe on 
follow up is suggested.

 

 

 

 ROBBY Mack MD on December 30, 2017 at 21:36           

Board Certified Radiologist.

 This report was verified electronically.

## 2017-12-30 NOTE — HHI.PR
Subjective


Patient symptoms today


Pt seen and examined. Creatinine improving. U/O 2950cc





Objective


Vital Signs





Vital Signs








  Date Time  Temp Pulse Resp B/P (MAP) Pulse Ox O2 Delivery O2 Flow Rate FiO2


 


12/30/17 06:00  91      


 


12/30/17 04:00  91      


 


12/30/17 04:00  91 32 123/59 (80) 98   


 


12/30/17 02:00  87      


 


12/30/17 00:00  90      


 


12/30/17 00:00 97.5 90 29 103/59 (74) 96   


 


12/29/17 23:45     98   


 


12/29/17 22:00  89      


 


12/29/17 20:00  88      


 


12/29/17 20:00 97.7 88 20 133/65 (87) 99   


 


12/29/17 19:31     100 Nasal Cannula 2.00 


 


12/29/17 18:00  84      


 


12/29/17 16:00  87      


 


12/29/17 16:00 98.0 87 21 103/56 (72) 99   


 


12/29/17 14:00  92      














Intake & Output  


 


 12/30/17 12/30/17





 07:00 19:00


 


Intake Total 2579 ml 


 


Output Total 1700 ml 


 


Balance 879 ml 


 


  


 


Intake IV Total 2579 ml 


 


Output Urine Total 1700 ml 


 


# Bowel Movements 0 








Result Diagram:  


12/30/17 0355 12/30/17 0355





Objective Remarks


Abd:soft,nt,nd


Meyer: urine clear


Scrotal cellulitis present; creme covering scrotum


Medications and IVs





Current Medications








 Medications


  (Trade)  Dose


 Ordered  Sig/Dre


 Route  Start Time


 Stop Time Status Last Admin


 


 Pharmacy Profile


 Note  0 ml @ 0


 mls/hr  UNSCH


 OTHER  12/29/17 00:00


     


 


 


 Clindamycin/


 Sodium Chloride  50 ml @ 


 100 mls/hr  Q8H


 IV  12/29/17 00:00


   Future hold 12/30/17 09:52


 


 


  (NS Flush)  2 ml  UNSCH  PRN


 IV FLUSH  12/29/17 00:15


     


 


 


  (NS Flush)  2 ml  BID


 IV FLUSH  12/29/17 09:00


    12/30/17 09:00


 


 


  (Tylenol)  650 mg  Q6H  PRN


 PO  12/29/17 00:15


     


 


 


  (Norco  5-325 Mg)  1 tab  Q4H  PRN


 PO  12/29/17 00:15


     


 


 


  (Morphine Inj)  2 mg  Q2H  PRN


 IV  12/29/17 00:15


    12/30/17 06:17


 


 


  (Pepcid)  10 mg  Q12HR


 PO  12/29/17 09:00


    12/30/17 09:59


 


 


  (Zofran Inj)  4 mg  Q6H  PRN


 IV PUSH  12/29/17 00:15


     


 


 


 Miscellaneous


 Information  1  Q361D


 XX  12/29/17 00:15


     


 


 


  (Chlorhexidine


 2% Cloth)  3 pack


 Taper  DAILY@04


 TOP  12/29/17 04:00


 12/25/18 03:59  12/30/17 03:40


 


 


  (Chlorhexidine


 2% Cloth)  3 pack  UNSCH  PRN


 TOP  12/29/17 00:15


     


 


 


  (Holly-Colace)  1 tab  BID


 PO  12/29/17 09:00


    12/30/17 09:00


 


 


  (Senokot)  17.2 mg  Q12H  PRN


 PO  12/29/17 00:15


     


 


 


  (Dulcolax Supp)  10 mg  DAILY  PRN


 RECTAL  12/29/17 00:15


     


 


 


  (Lactulose Liq)  30 ml  DAILY  PRN


 PO  12/29/17 00:15


     


 


 


  (Pill Splitter)  1 ea  UNSCH  PRN


 OTHER  12/29/17 00:15


     


 


 


  (NS Flush)    DAILY


 IV FLUSH  12/30/17 09:00


    12/30/17 09:00


 


 


  (NS Flush)    UNSCH  PRN


 IV FLUSH  12/29/17 09:30


     


 


 


 Piperacillin Sod/


 Tazobactam Sod  50 ml @ 


 100 mls/hr  Q6H


 IV  12/29/17 14:00


    12/30/17 09:54


 


 


  (Mycelex)  10 mg  5 TIMES A  DAY


 BUCCAL  12/29/17 14:00


    12/30/17 05:25


 


 


  (D50w (Vial) Inj)  50 ml  UNSCH  PRN


 IV PUSH  12/29/17 13:45


     


 


 


  (Glucagon Inj)  1 mg  UNSCH  PRN


 OTHER  12/29/17 13:45


     


 


 


  (NovoLIN R


 SUPPLEMENTAL


 SCALE)  1  ACHS03 SLIDE  SCALE


 SQ  12/29/17 17:00


    12/30/17 08:00


 


 


 Sodium Chloride  1,000 ml @ 


 250 mls/hr  Q4H


 IV  12/29/17 14:00


    12/30/17 09:58


 


 


  (Heparin Inj)  5,000 units  Q8HR


 SQ  12/29/17 22:00


    12/30/17 05:25


 


 


 Vancomycin HCl


 1000 mg/Sodium


 Chloride  250 ml @ 


 250 mls/hr  Q18H


 IV  12/30/17 09:00


    12/30/17 09:00


 


 


 Miscellaneous


 Information  SPECIFIC


 LAB TO BE


 ...  ONCE  ONCE


 .XX  1/1/18 14:45


 1/1/18 14:46   


 











Assessment and Plan


Assessment and Plan


Urologic impression:


#1 urinary retention of indeterminate etiology, rule out bladder outlet 

obstruction versus neurogenic bladder dysfunction


#2 bilateral hydroureteronephrosis related to urinary retention


#3 scrotal cellulitis with possible early abscess formation involving left base 

of penis





Recommendations:


#1 continue with Meyer catheter to gravity drainage until outpatient urologic 

workup can be completed.


#2 will eventually require cystoscopy and possibly a full urodynamics 

evaluation as an outpatient.


#3 agree with present antibiotics for scrotal cellulitis


#4 repeat a CT scan of the abdomen and pelvis for reassessment


#5 Dr. Renteria on call this weekend





12/30


67 y.o. male with scrotal cellulitis and ARF with AUR


Maintain meyer catheter


Creatinine down to 1.13


Scrotal elevation











Kurt Renteria DO Dec 30, 2017 12:28

## 2017-12-30 NOTE — ECHRPT
Indication:   SHORTNESS OF BREATH

 

 CONCLUSIONS

 Normal left ventricular size. 

 Wall thickness is normal. 

 The left ventricular systolic function is normal with an estimated ejection fraction in the range of
 60-65%. 

 Nonobstructive prominent basal hypertrophy is present consistent with sigmoid septum. 

 Mild mitral valve regurgitation. 

 Moderate mitral annular calcification. 

 Aortic valve sclerosis is present. 

 There is mild tricuspid valve regurgitation. 

 The estimated pulmonary arterial pressure is 35.4 mmHg. 

 There is a small pericardial effusion present. The pericardial effusion is primarily located posteri
ahmet (no 

 evidence of hemodynamic compromise.)

 

 BP:  131   / 69      HR: 92                       Rhythm:           Sinus

 

 MEASUREMENTS  (Male / Female) Normal Values       Technical Quality:Fair

 2D ECHO

 LV Diastolic Diameter PLAX        4.2 cm                4.2 - 5.9 / 3.9 - 5.3 cm

 LV Systolic Diameter PLAX         2.6 cm                

 IVS Diastolic Thickness           0.9 cm                0.6 - 1.0 / 0.6 - 0.9 cm

 LVPW Diastolic Thickness          0.9 cm                0.6 - 1.0 / 0.6 - 0.9 cm

 LV Relative Wall Thickness        0.4                   

 RV Internal Dim ED PLAX           3.3 cm                

 LVOT Diameter                     2.1 cm                

 Aortic Root Diameter              3.3 cm                

 LA Systolic Diameter LX           3.0 cm                3.0 - 4.0 / 2.7 - 3.8 cm

 

 M-MODE

 AV Cusp Separation MM             2.1 cm                

 

 DOPPLER

 AV Peak Velocity                  135.0 cm/s            

 AV Peak Gradient                  7.3 mmHg              

 AV Mean Gradient                  4.0 mmHg              

 AV Velocity Time Integral         26.5 cm               

 LVOT Peak Velocity                140.0 cm/s            

 LVOT Peak Gradient                7.8 mmHg              

 LVOT Velocity Time Integral       31.2 cm               

 LVOT Cardiac Index                5669.7 cm/minm     

 AV Area Cont Eq vti               4.1 cm               

 AV Area Cont Eq pk                3.6 cm               

 Mitral E Point Velocity           75.5 cm/s             

 Mitral A Point Velocity           114.0 cm/s            

 Mitral E to A Ratio               0.7                   

 LV E' Lateral Velocity            10.3 cm/s             

 Mitral E to LV E' Lateral Ratio   7.3                   

 LV E' Septal Velocity             5.9 cm/s              

 Mitral E to LV E' Septal Ratio    12.9                  

 TR Peak Velocity                  252.0 cm/s            

 TR Peak Gradient                  25.4 mmHg             

 Right Atrial Pressure             10.0 mmHg             

 Pulmonary Artery Systolic Pressu  35.4 mmHg             

 Right Ventricular Systolic Press  35.4 mmHg             

 PV Peak Velocity                  64.7 cm/s             

 PV Peak Gradient                  1.7 mmHg              

 

 

 FINDINGS

 

 LEFT VENTRICLE

 The left ventricular systolic function is normal with an estimated ejection fraction in the range of
 60-65%. 

 

 Normal left ventricular size. 

 Wall thickness is normal. 

 The left ventricular systolic function is grossly normal on limited imaging. 

 Nonobstructive prominent basal hypertrophy is present consistent with sigmoid septum. 

 Doppler parameters are consistent with impaired left ventricular relaxtion (grade 1 diastolic dysfun
ction). 

 

 RIGHT VENTRICLE

 Normal right ventricular size and systolic function.  

 

 LEFT ATRIUM

 The left atrial size is normal.  

 

 RIGHT ATRIUM

 The right atrial size is normal.  

 

 ATRIAL SEPTUM

 Normal atrial septal thickness without atrial level shunting by limited color doppler interrogation.
  

 

 AORTA

 The aortic root and proximal ascending aorta are normal in size on limited imaging.  

 

 MITRAL VALVE

 Mild mitral valve regurgitation. 

 Moderate mitral annular calcification. 

 

 AORTIC VALVE

 Aortic valve sclerosis is present. 

 

 TRICUSPID VALVE

 There is mild tricuspid valve regurgitation. 

 The estimated pulmonary arterial pressure is 35.4 mmHg. 

 

 PULMONARY VALVE

 The pulmonary valve is not well visualized. 

 

 VESSELS

 The inferior vena cava is normal in size.  

 

 PERICARDIUM

 There is a small pericardial effusion present. The pericardial effusion is primarily located posteri
ahmet. 

 

 

 

 

 

 

  Julio Calvillo MD

  (Electronically Signed)

  Final Date:30 December 2017 14:37

## 2017-12-31 VITALS
TEMPERATURE: 98 F | RESPIRATION RATE: 20 BRPM | HEART RATE: 89 BPM | OXYGEN SATURATION: 95 % | SYSTOLIC BLOOD PRESSURE: 146 MMHG | DIASTOLIC BLOOD PRESSURE: 69 MMHG

## 2017-12-31 VITALS
SYSTOLIC BLOOD PRESSURE: 124 MMHG | HEART RATE: 86 BPM | OXYGEN SATURATION: 96 % | RESPIRATION RATE: 28 BRPM | DIASTOLIC BLOOD PRESSURE: 61 MMHG

## 2017-12-31 VITALS — HEART RATE: 85 BPM

## 2017-12-31 VITALS
HEART RATE: 87 BPM | SYSTOLIC BLOOD PRESSURE: 141 MMHG | DIASTOLIC BLOOD PRESSURE: 73 MMHG | TEMPERATURE: 98.3 F | OXYGEN SATURATION: 96 % | RESPIRATION RATE: 36 BRPM

## 2017-12-31 VITALS — HEART RATE: 88 BPM

## 2017-12-31 VITALS
HEART RATE: 88 BPM | OXYGEN SATURATION: 94 % | SYSTOLIC BLOOD PRESSURE: 120 MMHG | TEMPERATURE: 98 F | DIASTOLIC BLOOD PRESSURE: 58 MMHG | RESPIRATION RATE: 22 BRPM

## 2017-12-31 VITALS
OXYGEN SATURATION: 93 % | TEMPERATURE: 98.4 F | DIASTOLIC BLOOD PRESSURE: 57 MMHG | SYSTOLIC BLOOD PRESSURE: 116 MMHG | HEART RATE: 89 BPM | RESPIRATION RATE: 20 BRPM

## 2017-12-31 VITALS
HEART RATE: 89 BPM | SYSTOLIC BLOOD PRESSURE: 160 MMHG | RESPIRATION RATE: 19 BRPM | TEMPERATURE: 98 F | OXYGEN SATURATION: 95 % | DIASTOLIC BLOOD PRESSURE: 70 MMHG

## 2017-12-31 VITALS — HEART RATE: 95 BPM

## 2017-12-31 VITALS — HEART RATE: 86 BPM

## 2017-12-31 VITALS — OXYGEN SATURATION: 97 %

## 2017-12-31 VITALS — HEART RATE: 110 BPM

## 2017-12-31 VITALS — HEART RATE: 90 BPM

## 2017-12-31 VITALS — HEART RATE: 84 BPM

## 2017-12-31 VITALS — HEART RATE: 87 BPM

## 2017-12-31 LAB
BASOPHILS # BLD AUTO: 0 TH/MM3 (ref 0–0.2)
BASOPHILS NFR BLD: 0.1 % (ref 0–2)
BUN SERPL-MCNC: 23 MG/DL (ref 7–18)
CALCIUM SERPL-MCNC: 7.7 MG/DL (ref 8.5–10.1)
CHLORIDE SERPL-SCNC: 106 MEQ/L (ref 98–107)
CREAT SERPL-MCNC: 0.81 MG/DL (ref 0.6–1.3)
EOSINOPHIL # BLD: 0 TH/MM3 (ref 0–0.4)
EOSINOPHIL NFR BLD: 0 % (ref 0–4)
ERYTHROCYTE [DISTWIDTH] IN BLOOD BY AUTOMATED COUNT: 12.4 % (ref 11.6–17.2)
GFR SERPLBLD BASED ON 1.73 SQ M-ARVRAT: 95 ML/MIN (ref 89–?)
GLUCOSE SERPL-MCNC: 274 MG/DL (ref 74–106)
HBA1C MFR BLD: 11.4 % (ref 4.3–6)
HCO3 BLD-SCNC: 24.2 MEQ/L (ref 21–32)
HCT VFR BLD CALC: 31.9 % (ref 39–51)
HGB BLD-MCNC: 10.8 GM/DL (ref 13–17)
LYMPHOCYTES # BLD AUTO: 0.6 TH/MM3 (ref 1–4.8)
LYMPHOCYTES NFR BLD AUTO: 3.1 % (ref 9–44)
MCH RBC QN AUTO: 29.2 PG (ref 27–34)
MCHC RBC AUTO-ENTMCNC: 33.9 % (ref 32–36)
MCV RBC AUTO: 86.3 FL (ref 80–100)
MONOCYTE #: 0.3 TH/MM3 (ref 0–0.9)
MONOCYTES NFR BLD: 1.9 % (ref 0–8)
NEUTROPHILS # BLD AUTO: 17.5 TH/MM3 (ref 1.8–7.7)
NEUTROPHILS NFR BLD AUTO: 94.9 % (ref 16–70)
PLATELET # BLD: 157 TH/MM3 (ref 150–450)
PMV BLD AUTO: 9.5 FL (ref 7–11)
RBC # BLD AUTO: 3.69 MIL/MM3 (ref 4.5–5.9)
SODIUM SERPL-SCNC: 138 MEQ/L (ref 136–145)
WBC # BLD AUTO: 18.4 TH/MM3 (ref 4–11)

## 2017-12-31 RX ADMIN — CLOTRIMAZOLE SCH MG: 10 LOZENGE ORAL at 22:00

## 2017-12-31 RX ADMIN — CLINDAMYCIN PHOSPHATE SCH MLS/HR: 600 INJECTION, SOLUTION INTRAVENOUS at 00:52

## 2017-12-31 RX ADMIN — VANCOMYCIN HYDROCHLORIDE SCH MLS/HR: 1 INJECTION, SOLUTION INTRAVENOUS at 23:13

## 2017-12-31 RX ADMIN — POTASSIUM CHLORIDE PRN MLS/HR: 400 INJECTION, SOLUTION INTRAVENOUS at 14:43

## 2017-12-31 RX ADMIN — VANCOMYCIN HYDROCHLORIDE SCH MLS/HR: 1 INJECTION, SOLUTION INTRAVENOUS at 02:58

## 2017-12-31 RX ADMIN — HUMAN INSULIN SCH: 100 INJECTION, SOLUTION SUBCUTANEOUS at 23:11

## 2017-12-31 RX ADMIN — CLOTRIMAZOLE SCH MG: 10 LOZENGE ORAL at 17:30

## 2017-12-31 RX ADMIN — CHLORHEXIDINE GLUCONATE SCH PACK: 500 CLOTH TOPICAL at 04:00

## 2017-12-31 RX ADMIN — CLINDAMYCIN PHOSPHATE SCH MLS/HR: 600 INJECTION, SOLUTION INTRAVENOUS at 08:22

## 2017-12-31 RX ADMIN — TAMSULOSIN HYDROCHLORIDE SCH MG: 0.4 CAPSULE ORAL at 14:40

## 2017-12-31 RX ADMIN — FAMOTIDINE SCH MG: 20 TABLET, FILM COATED ORAL at 23:11

## 2017-12-31 RX ADMIN — HUMAN INSULIN SCH: 100 INJECTION, SOLUTION SUBCUTANEOUS at 08:00

## 2017-12-31 RX ADMIN — CLOTRIMAZOLE SCH MG: 10 LOZENGE ORAL at 05:33

## 2017-12-31 RX ADMIN — TAZOBACTAM SODIUM AND PIPERACILLIN SODIUM SCH MLS/HR: 250; 2 INJECTION, SOLUTION INTRAVENOUS at 23:12

## 2017-12-31 RX ADMIN — ACYCLOVIR SCH UNITS: 800 TABLET ORAL at 23:12

## 2017-12-31 RX ADMIN — HEPARIN SODIUM SCH UNITS: 10000 INJECTION, SOLUTION INTRAVENOUS; SUBCUTANEOUS at 05:34

## 2017-12-31 RX ADMIN — TAZOBACTAM SODIUM AND PIPERACILLIN SODIUM SCH MLS/HR: 250; 2 INJECTION, SOLUTION INTRAVENOUS at 14:41

## 2017-12-31 RX ADMIN — STANDARDIZED SENNA CONCENTRATE AND DOCUSATE SODIUM SCH TAB: 8.6; 5 TABLET, FILM COATED ORAL at 08:24

## 2017-12-31 RX ADMIN — TAZOBACTAM SODIUM AND PIPERACILLIN SODIUM SCH MLS/HR: 250; 2 INJECTION, SOLUTION INTRAVENOUS at 02:09

## 2017-12-31 RX ADMIN — STANDARDIZED SENNA CONCENTRATE AND DOCUSATE SODIUM SCH TAB: 8.6; 5 TABLET, FILM COATED ORAL at 23:12

## 2017-12-31 RX ADMIN — HEPARIN SODIUM SCH UNITS: 10000 INJECTION, SOLUTION INTRAVENOUS; SUBCUTANEOUS at 14:39

## 2017-12-31 RX ADMIN — HUMAN INSULIN SCH: 100 INJECTION, SOLUTION SUBCUTANEOUS at 17:29

## 2017-12-31 RX ADMIN — ASPIRIN SCH MG: 325 TABLET ORAL at 14:40

## 2017-12-31 RX ADMIN — HEPARIN SODIUM SCH UNITS: 10000 INJECTION, SOLUTION INTRAVENOUS; SUBCUTANEOUS at 23:11

## 2017-12-31 RX ADMIN — Medication SCH ML: at 08:23

## 2017-12-31 RX ADMIN — POTASSIUM CHLORIDE PRN MLS/HR: 400 INJECTION, SOLUTION INTRAVENOUS at 16:51

## 2017-12-31 RX ADMIN — FAMOTIDINE SCH MG: 20 TABLET, FILM COATED ORAL at 08:24

## 2017-12-31 RX ADMIN — Medication SCH ML: at 08:25

## 2017-12-31 RX ADMIN — Medication SCH ML: at 23:13

## 2017-12-31 RX ADMIN — CLOTRIMAZOLE SCH MG: 10 LOZENGE ORAL at 14:40

## 2017-12-31 RX ADMIN — HUMAN INSULIN SCH: 100 INJECTION, SOLUTION SUBCUTANEOUS at 12:00

## 2017-12-31 RX ADMIN — CLOTRIMAZOLE SCH MG: 10 LOZENGE ORAL at 08:24

## 2017-12-31 RX ADMIN — TAZOBACTAM SODIUM AND PIPERACILLIN SODIUM SCH MLS/HR: 250; 2 INJECTION, SOLUTION INTRAVENOUS at 08:22

## 2017-12-31 NOTE — HHI.CCPN
Subjective


Remarks/Hospital Course


66 yo WM with no significant known medical history though he has not been 

followed by a physician who presents to [facility] emergency 

department complaining of two-week history of excessive thirst, weight loss, 

decreased appetite, and feeling weak.  Today he slipped and fell and hit his 

head on the tile floor.  His wife states no loss of consciousness.  Head CT 

showed no acute traumatic injury.  There is an age-indeterminate left thalamic 

infarct.  C-spine was negative.  He has hyperosmolar hyperglycemic state with 

glucose greater than 1600.  Beta hydroxybutyrate is normal.  Anion gap is 17.  

Potassium is 5.9.  He also has severe scrotal swelling and pain.  He states 

this is been going on for 2 days.  He has had urinary incontinence, dysuria and 

so the scrotal skin has remained wet and the skin is weeping and tender.   Now 

he states unable to void.  He denies flank pain, vomiting, diarrhea, fever, 

cough.  He has received 1 L normal saline bolus and insulin 6 units IV bolus in 

the emergency department.  Vancomycin and Zosyn has been ordered.  Ultrasound 

of the scrotum showed edema, normal flow to testicle.





12/29: Patient is mean arterial pressures 60-70 past few hours.  Received 2 L 

bolus normal saline currently on normal saline at 250 cc an hour.  Central line 

placed this a.m. for lab draws/impending need for vasopressors.  Patient is 

arousable and follows simple commands.  Remains lethargic.  Did receive 

midazolam for Bautista placement this a.m. 2 mg





12/30 WBC up to 18. Afebrile. . Evaluated by speech therapy and okay for pureed 

diet. Alert but still confused. MRI planned for today.  Did not require 

vasopressors





SUBJECTIVE:





12/31 White blood cell count remains elevated at 18. Afebrile.   Blood and 

urine Cultures negative. Repeat CT is planned for today to follow up scrotal 

cellulitis   


Hemodynamic stable.  MRI brain yesterday showed possible small acute infarct 

left posterior temporal lobe. Will need to start ASA, Urology ok with starting 

even if he might need surgery.  Mental status overall better today, less 

confused.  Confusion secondary toxic/metabolic secondary to sepsis/hyperosmolar 

hyperglycemia.





Objective





Vital Signs








  Date Time  Temp Pulse Resp B/P (MAP) Pulse Ox O2 Delivery O2 Flow Rate FiO2


 


12/31/17 10:00  87      


 


12/31/17 08:00 98.0  20 146/69 (94) 95   


 


12/29/17 19:31      Nasal Cannula 2.00 














Intake and Output   


 


 12/31/17 12/31/17 1/1/18





 08:00 16:00 00:00


 


Intake Total 520 ml  


 


Output Total 1350 ml  


 


Balance -830 ml  








Result Diagram:  


12/31/17 0955                                                                  

              12/31/17 0955





Other Results





Microbiology








 Date/Time


Source Procedure


Growth Status


 


 


 12/29/17 10:35


Urine Clean Catch Urine Culture - Final


NO GROWTH IN 48 HOURS. Complete








Imaging





Last Impressions








Carotid Artery Ultrasound 12/29/17 0000 Signed





Impressions: 





 Service Date/Time:  Friday, December 29, 2017 08:18 - CONCLUSION:  No evidence 





 of flow-limiting carotid stenosis.      Jd Steward MD 


 


Scrotum Ultrasound 12/28/17 0000 Signed





Impressions: 





 Service Date/Time:  Thursday, December 28, 2017 21:57 - CONCLUSION:  Prominent 





 scrotal edema. Testicles are unremarkable     Jd Steward MD 


 


Head CT 12/28/17 0000 Signed





Impressions: 





 Service Date/Time:  Thursday, December 28, 2017 22:28 - CONCLUSION: 





 Age-indeterminate left thalamic lacunar infarct. No other acute intracranial 





 findings identified.     Henry Zepeda MD 


 


Chest X-Ray 12/28/17 0000 Signed





Impressions: 





 Service Date/Time:  Thursday, December 28, 2017 23:27 - CONCLUSION: No acute 





 cardiopulmonary disease identified.     Henry Zepeda MD 


 


Cervical Spine CT 12/28/17 0000 Signed





Impressions: 





 Service Date/Time:  Thursday, December 28, 2017 22:29 - CONCLUSION:  No 

evidence 





 of fracture. Multilevel degenerative findings.     Henry Zepeda MD 


 


Abdomen/Pelvis CT 12/28/17 0000 Signed





Impressions: 





 Service Date/Time:  Friday, December 29, 2017 02:42 - CONCLUSION:  1. Marked 





 scrotal skin thickening and scrotal edema. No gas identified in the soft 





 tissues.  2.  Elongated fluid density within the left side of the base of the 





 penis may represent early abscess formation. 3. Diffuse severe superficial 

soft 





 tissue edema of the lower abdomen, pelvis, and proximal lower extremities. 4. 





 Moderate bilateral hydronephrosis and diffuse hydroureter as well as marked 





 distention of urinary bladder. Findings suggest possible bladder outlet 





 obstruction. Prostate measures 4.1 x 2.9 cm in transverse dimensions.    

Henry Zepeda MD 








Objective Remarks


GENERAL: 66 yo  male sitting up in ISC bed. 


HEAD: Atraumatic. Normocephalic. 


EYES: Pupils equal and round, 3 mm and reactive to 2 bilaterally.


ENT: No nasal bleeding or discharge.  Mucous membranes moist .  There is thrush 

on his palate, buccal mucosa, gingiva. 


NECK: Trachea midline. No JVD. 


CARDIOVASCULAR: Regular rate and rhythm, no murmurs, rubs, gallops


RESPIRATORY: Breathing comfortably with no accessory muscle use.  Clear to 

auscultation bilaterally.  On RA.   No wheezes, Rales, rhonchi.


GASTROINTESTINAL: Abdomen soft, nondistended, bowel sounds present, no rebound 

or guarding.  He has edema overlying lower abdomen/pelvis and this extends up 

into the RLQ with edema there. The erythema that was once there is improved. 

The edema is improving too.  The edematous area is mildly tender, but much 

better than yesterday.  There is no mass/fluctuance.  No CVAT. 


SKIN/SCROTUM:  warm, dry, well perfused. .   The scrotum is diffusely swollen 

and the skin is excoriated and weeping serous fluid. No fluctuant mass 

appreciated. No necrotizing skin lesion noted. No perianal erythema. I do not 

appreciate any fluctuance on the penis. 


MUSCULOSKELETAL: There is mild dependent edema of right upper thigh.  No 

obvious deformities. 


NEUROLOGICAL: Awake and alert during answering questions appropriately..  

Normal speech.  Moves all extremities without focal deficit.


Date of Insertion:  Dec 29, 2017


Date of Insertion:  Dec 29, 2017


Line:  Central Venous Catheter


Side:  Right


Location:  Internal, Jugular





A/P


Problem List:  


(1) Type 2 diabetes mellitus with hyperosmolar nonketotic hyperglycemia


ICD Code:  E11.01 - Type 2 diabetes mellitus with hyperosmolarity with coma


Status:  Chronic


(2) GRACIELA (acute kidney injury)


ICD Code:  N17.9 - Acute kidney failure, unspecified


Status:  Acute


(3) Lactic acidemia


ICD Code:  E87.2 - Acidosis


Status:  Acute


(4) Closed head injury


ICD Code:  S09.90XA - Unspecified injury of head, initial encounter


Status:  Acute


(5) Leukocytosis


ICD Code:  D72.829 - Elevated white blood cell count, unspecified


Status:  Acute


(6) Cellulitis of scrotum


ICD Code:  N49.2 - Inflammatory disorders of scrotum


Status:  Acute


(7) Fall


ICD Code:  W19.XXXA - Unspecified fall, initial encounter


Status:  Acute


(8) Lacunar stroke


ICD Code:  I63.9 - Cerebral infarction, unspecified


Status:  Chronic


(9) Urinary retention


ICD Code:  R33.9 - Retention of urine, unspecified


Status:  Acute


(10) Hydronephrosis


ICD Code:  N13.30 - Unspecified hydronephrosis


Status:  Acute


(11) Thrush


ICD Code:  B37.0 - Candidal stomatitis


Status:  Acute


(12) Severe sepsis


ICD Code:  A41.9 - Sepsis, unspecified organism; R65.20 - Severe sepsis without 

septic shock


Assessment and Plan


NEURO/PSYCH:


Status post mechanical fall


Age indeterminate Left thalamic lacunar infarct





CT brain -revealed an age-indeterminate left thalamic infarct.


CT Cspine -degenerative changes noted.


Carotid Dopplers - no flow limiting stenosis


2-D echo no evidence of embolic source


MRI/A brain - possible tiny subcentimeter area acute infarction left posterior 

temporal lobe. MRA with distal atherosclerotic changes. 


Lipid panel revealed a low cholesterol/HDL.


Initiate antiplatelet therapy with  mg po daily. Discussed with urology, 

should not affect ability to perform debridement. 





Currently on acetaminophen 650 mg every 6 hours when necessary fever/pain 1- 2.


Hydrocodone/acetaminophen 5/325 one tablet every 4 hours when necessary pain 3-5


Morphine sulfate 2 mg IV every 2 hours when necessary breakthrough pain





Out of bed daily, PT/OT. 





RESP:


On RA. 


Incentive spirometry while awake


Chest x-ray 12/29 revealed no acute cardiopulmonary disease





CV:


Hypotension - severe sepsis


Lactic acidemia


S/p aggressive fluid resusc for hyperosmolar hyperglycemic state.


Did not require vasopressors.


Ace I on hold upon admission due to GRACIELA. 


Troponin 0.02


2-D echocardiogram - EF 60-65%.  Pulmonary artery pressure 35 mmHg.  Small 

pericardial effusion without hemodynamic compromise.





GI: 


Hypoalbuminemia


Elevated alkaline phosphatase


Speech therapy advance to soft diet and liquids on 12/30 2000 ADA


Famotidine 10 mg twice a day for GI prophylaxis


Docusate sodium/senna 1 tablet twice a day for bowel regimen





FEN/RENAL/: 





Pseudohyponatremia, resolved. 


Acute kidney injury


Urinary obstruction. Bilateral hydronephrosis/hydroureter -


Penis fluid collection - possible early abscess per CT report





Scrotal ultrasound -normal testes.  No flow limiting effect noted.  Diffuse 

scrotal edema.


CT abdomen/pelvis - Marked scrotal skin thickening and scrotal edema. No gas 

identified in the soft tissues.  Elongated fluid density within the left side 

of the base of the penis 5.5 x 1 cm may represent early abscess formation.  

Diffuse severe superficial soft tissue edema of the lower abdomen, pelvis, and 

proximal lower extremities.  Moderate bilateral hydronephrosis and diffuse 

hydroureter as well as marked distention of urinary bladder. Findings suggest 

possible bladder outlet obstruction. Prostate measures 4.1 x 2.9 cm in 

transverse dimensions. 


Maintain 14 Mauritian coud catheter


Urology consult Dr. Foster recommends continue with Bautista catheter to gravity 

drainage until outpatient urologic workup can be completed.


   will eventually require cystoscopy and possibly a full urodynamics 

evaluation as an outpatient.


   Pitting CT  scan of the abdomen and pelvis  12/31.


   Discussed with Dr. Renteria. 


Urine eosinophils 1-2


 Start tamulosin 0.4 mg po daily. 





ID:





Thrush


Bacteriuria likely UTI


Scrotal cellulitis





Day #4 vancomycin, piperacillin/tazobactam and clindamycin for scrotal 

cellulitis/urinary tract infection. If CT scan improved, would d/c clindamycin. 


Fluconazole 100 mg IV x1.  Given 12/29


Clotrimazole 10 mg 5 times daily for oral thrush





Pertinent cultures





12/29 - blood cultures 2 - neg


12/29 - urine culture - neg





HEME: 





Leukocytosis - neutrophil predominant


Normocytic anemia





Monitor CBC daily.  Follow trends


Coags c/w low grade DIC, fibrinogen preserved. 





ENDO:





Hyperosmolar hyperglycemic nonketotic state, resolved





off  insulin drip


On medium dose insulin sliding scale. Detemir 5 subcut q12. 





Follow-up hemoglobin A1c


TSH 0.432





PROPH: 





SCD//Heparin subcutaneous for DVT prophylaxis


Famotidine for stress ulcer prophylaxis.





ACCESS:  [facility] CVL 12/29 #3. D/c CVL today 





Discussed with Dr Renteria. If not intending surgical debridement will d/c CVL 

and transfer to floor. 





Level II followup





Problem Qualifiers





(1) Closed head injury:  


Qualified Codes:  S09.90XA - Unspecified injury of head, initial encounter


(2) Fall:  


Qualified Codes:  W19.XXXA - Unspecified fall, initial encounter








Kimberly Dupree MD Dec 31, 2017 11:52

## 2017-12-31 NOTE — RADRPT
EXAM DATE/TIME:  12/31/2017 12:49 

 

[facility] COMPARISON:     

CT ABDOMEN & PELVIS W/O CONTRAST, December 29, 2017, 2:42.

 

 

INDICATIONS :     

Lower abdomen pain today.

                  

 

ORAL CONTRAST:      

No oral contrast ingested.

                  

 

RADIATION DOSE:     

12.99 CTDIvol (mGy) 

 

 

MEDICAL HISTORY :     

Non-responsive.  

 

SURGICAL HISTORY :      

Non-responsive. 

 

ENCOUNTER:      

Initial

 

ACUITY:      

1 day

 

PAIN SCALE:      

Non-responsive

 

LOCATION:       

Bilateral lower quadrant 

 

TECHNIQUE:     

Volumetric scanning of the abdomen and pelvis was performed.  Using automated exposure control and ad
justment of the mA and/or kV according to patient size, radiation dose was kept as low as reasonably 
achievable to obtain optimal diagnostic quality images.  DICOM format image data is available electro
nically for review and comparison.  

 

FINDINGS:     

 

LOWER LUNGS:     

There are mild bilateral pleural effusions. There is accompanying areas of atelectasis or consolidati
on at the posterior lung bases. There is a mild amount of pericardial fluid seen.

 

LIVER:     

Homogeneous density without lesion.  There is no dilation of the biliary tree.  No calcified gallston
es.

 

SPLEEN:     

Normal size without lesion.

 

PANCREAS:     

Within normal limits. 

 

KIDNEYS:     

There is moderate to severe dilatation of the left collecting system and left ureter. There is mild d
ilatation of the right collecting system and right ureter. There is a tiny 1 mm nonobstructing left r
enal stone seen. No stones are seen in the ureters. There is a nonspecific 1.3 cm mass at the superio
r lateral right kidney. This could represent a cyst although is nonspecific on this noncontrast CT ex
amination.

 

ADRENAL GLANDS:     

Within normal limits.

 

VASCULAR:     

There is no aortic aneurysm.

 

BOWEL/MESENTERY:     

The stomach, small bowel, and colon demonstrate no acute abnormality.  There is no free intraperitone
al air or fluid. There is a moderate amount stool in the colon.

 

ABDOMINAL WALL:     

Within normal limits.

 

RETROPERITONEUM:     

There is no lymphadenopathy.

 

BLADDER:     

There is a Bautista catheter in place. The bladder wall appears thickened. Air is seen within the bladde
r likely from the Bautista catheter insertion.

 

REPRODUCTIVE:     

The prostate does not appear enlarged. Prostatic calcifications are present. There is persistent line
ar low density seen at the left base of the penis measuring 3.3 x 0.8 cm. This appears less prominent
 on the current exam. The scrotum was not scanned.

 

INGUINAL:     

There is no lymphadenopathy or hernia.

 

MUSCULOSKELETAL:     

There is mild compressive changes at the T8 vertebral body. The age of this deformity is not known. T
here does appear to be anasarca. There is edema within the subcutaneous soft tissues throughout

 

CONCLUSION:     

1. Persistent linear low density area seen in the left base of gas which appears less prominent. An a
bscess cannot be excluded.

2. Dilatation of the collecting systems and ureters bilaterally being worse on the left. No obstructi
ng stones are seen. This could reflect outlet obstruction at the bladder. The prostate does not appea
r enlarged. The bladder is diffusely thickened.

3. Tiny nonobstructing left renal stone.

4. Bilateral mild pleural effusions and a mild amount of pericardial fluid seen.

5. Suspected anasarca.

 

 

 

 Jd Araujo MD on December 31, 2017 at 13:07           

Board Certified Radiologist.

 This report was verified electronically.

## 2017-12-31 NOTE — HHI.PR
Subjective


Patient symptoms today


Pt seen and examined. Pt alert awake.





Objective


Vital Signs





Vital Signs








  Date Time  Temp Pulse Resp B/P (MAP) Pulse Ox O2 Delivery O2 Flow Rate FiO2


 


12/31/17 10:00  87      


 


12/31/17 09:00  95      


 


12/31/17 08:00  89      


 


12/31/17 08:00 98.0 89 20 146/69 (94) 95   


 


12/31/17 07:00  86      


 


12/31/17 06:00  85      


 


12/31/17 04:02   18     


 


12/31/17 04:00  89      


 


12/31/17 04:00 98.4 89 20 116/57 (76) 93   


 


12/31/17 03:30     97   


 


12/31/17 02:00  88      


 


12/31/17 00:00  86 28 124/61 (82) 96   


 


12/31/17 00:00  86      


 


12/30/17 22:00  86      


 


12/30/17 20:00  92      


 


12/30/17 20:00 98.4 92 16 131/70 (90) 96   


 


12/30/17 19:28     98   


 


12/30/17 18:00  101      


 


12/30/17 17:00  95      


 


12/30/17 16:00  104      


 


12/30/17 16:00 98.1 104 35 120/62 (81) 97   


 


12/30/17 15:00  96      


 


12/30/17 14:00  100      


 


12/30/17 13:00  93      














Intake & Output  


 


 12/31/17 12/31/17





 07:00 19:00


 


Intake Total 570 ml 


 


Output Total 1350 ml 


 


Balance -780 ml 


 


  


 


Intake Oral 120 ml 


 


IV Total 450 ml 


 


Output Urine Total 1350 ml 


 


# Bowel Movements 0 








Result Diagram:  


12/31/17 0955 12/31/17 0955





Objective Remarks


Abd:soft,nt,nd


Meyer: urine clear


Scrotal cellulitis present; creme covering scrotum





12/31


Abd:soft,nt,nd


Scrotum: edema improved; no creptis


Medications and IVs





Current Medications








 Medications


  (Trade)  Dose


 Ordered  Sig/Dre


 Route  Start Time


 Stop Time Status Last Admin


 


 Pharmacy Profile


 Note  0 ml @ 0


 mls/hr  UNSCH


 OTHER  12/29/17 00:00


     


 


 


 Clindamycin/


 Sodium Chloride  50 ml @ 


 100 mls/hr  Q8H


 IV  12/29/17 00:00


   Future hold 12/31/17 08:22


 


 


  (NS Flush)  2 ml  UNSCH  PRN


 IV FLUSH  12/29/17 00:15


     


 


 


  (NS Flush)  2 ml  BID


 IV FLUSH  12/29/17 09:00


    12/31/17 08:23


 


 


  (Tylenol)  650 mg  Q6H  PRN


 PO  12/29/17 00:15


     


 


 


  (Norco  5-325 Mg)  1 tab  Q4H  PRN


 PO  12/29/17 00:15


    12/31/17 03:02


 


 


  (Morphine Inj)  2 mg  Q2H  PRN


 IV  12/29/17 00:15


    12/30/17 06:17


 


 


  (Pepcid)  10 mg  Q12HR


 PO  12/29/17 09:00


    12/31/17 08:24


 


 


  (Zofran Inj)  4 mg  Q6H  PRN


 IV PUSH  12/29/17 00:15


     


 


 


 Miscellaneous


 Information  1  Q361D


 XX  12/29/17 00:15


     


 


 


  (Chlorhexidine


 2% Cloth)  3 pack


 Taper  DAILY@04


 TOP  12/29/17 04:00


 12/25/18 03:59  12/31/17 04:00


 


 


  (Chlorhexidine


 2% Cloth)  3 pack  UNSCH  PRN


 TOP  12/29/17 00:15


     


 


 


  (Holly-Colace)  1 tab  BID


 PO  12/29/17 09:00


    12/31/17 08:24


 


 


  (Senokot)  17.2 mg  Q12H  PRN


 PO  12/29/17 00:15


     


 


 


  (Dulcolax Supp)  10 mg  DAILY  PRN


 RECTAL  12/29/17 00:15


     


 


 


  (Lactulose Liq)  30 ml  DAILY  PRN


 PO  12/29/17 00:15


     


 


 


  (Pill Splitter)  1 ea  UNSCH  PRN


 OTHER  12/29/17 00:15


     


 


 


  (NS Flush)    DAILY


 IV FLUSH  12/30/17 09:00


    12/31/17 08:25


 


 


  (NS Flush)    UNSCH  PRN


 IV FLUSH  12/29/17 09:30


     


 


 


 Piperacillin Sod/


 Tazobactam Sod  50 ml @ 


 100 mls/hr  Q6H


 IV  12/29/17 14:00


    12/31/17 08:22


 


 


  (Mycelex)  10 mg  5 TIMES A  DAY


 BUCCAL  12/29/17 14:00


    12/31/17 08:24


 


 


  (Heparin Inj)  5,000 units  Q8HR


 SQ  12/29/17 22:00


    12/31/17 05:34


 


 


 Vancomycin HCl


 1000 mg/Sodium


 Chloride  250 ml @ 


 250 mls/hr  Q18H


 IV  12/30/17 09:00


    12/31/17 02:58


 


 


 Miscellaneous


 Information  SPECIFIC


 LAB TO BE


 ...  ONCE  ONCE


 .XX  1/1/18 14:45


 1/1/18 14:46   


 


 


  (D50w (Vial) Inj)  50 ml  UNSCH  PRN


 IV PUSH  12/30/17 20:30


     


 


 


  (Glucagon Inj)  1 mg  UNSCH  PRN


 OTHER  12/30/17 20:30


     


 


 


  (NovoLIN R


 SUPPLEMENTAL


 SCALE)  1  ACHS SLIDING  SCALE


 SQ  12/30/17 21:00


    12/31/17 12:00


 


 


 Potassium Chloride  100 ml @ 


 50 mls/hr  Q2H  PRN


 IV  12/31/17 12:00


     


 


 


 Potassium Chloride  100 ml @ 


 50 mls/hr  Q2H  PRN


 IV  12/31/17 12:00


     


 


 


  (K-Lyte Cl  Eff)  50 meq  UNSCH  PRN


 PO  12/31/17 12:00


     


 


 


 Potassium Chloride  100 ml @ 


 25 mls/hr  UNSCH  PRN


 IV  12/31/17 12:00


     


 


 


 Potassium Chloride  100 ml @ 


 50 mls/hr  Q2H  PRN


 IV  12/31/17 12:00


     


 


 


 Magnesium Sulfate


 4 gm/Sodium


 Chloride  100 ml @ 


 50 mls/hr  UNSCH  PRN


 IV  12/31/17 12:00


     


 


 


  (Mag-Ox)  800 mg  UNSCH  PRN


 PO  12/31/17 12:00


     


 


 


 Magnesium Sulfate


 2 gm/Sodium


 Chloride  100 ml @ 


 50 mls/hr  UNSCH  PRN


 IV  12/31/17 12:00


     


 


 


  (K-Phos)  2,000 mg  Q4H  PRN


 PO  12/31/17 12:00


     


 


 


 Sodium Phosphate


 30 mmol/Sodium


 Chloride  250 ml @ 


 42 mls/hr  UNSCH  PRN


 IV  12/31/17 12:00


     


 


 


  (K-Phos)  2,000 mg  UNSCH  PRN


 PO/TUBE  12/31/17 12:00


     


 


 


 Potassium


 Phosphate 30 mmol/


 Sodium Chloride  260 ml @ 


 42 mls/hr  UNSCH  PRN


 IV  12/31/17 12:00


     


 


 


  (Flomax)  0.4 mg  DAILY


 PO  12/31/17 12:45


     


 











Assessment and Plan


Assessment and Plan


Urologic impression:


#1 urinary retention of indeterminate etiology, rule out bladder outlet 

obstruction versus neurogenic bladder dysfunction


#2 bilateral hydroureteronephrosis related to urinary retention


#3 scrotal cellulitis with possible early abscess formation involving left base 

of penis





Recommendations:


#1 continue with Meyer catheter to gravity drainage until outpatient urologic 

workup can be completed.


#2 will eventually require cystoscopy and possibly a full urodynamics 

evaluation as an outpatient.


#3 agree with present antibiotics for scrotal cellulitis


#4 repeat a CT scan of the abdomen and pelvis for reassessment


#5 Dr. Renteria on call this weekend





12/30


67 y.o. male with scrotal cellulitis and ARF with AUR


Maintain meyer catheter


Creatinine down to 1.13


Scrotal elevation





12/31


67 y.o male with scrotal cellulitis; cellulitis and edema is improving


CT scan pending


Maintain meyer


Flomax started due to SHINE/AUR











Kurt Renteria DO Dec 31, 2017 12:48

## 2017-12-31 NOTE — MB
cc:

JORDAN JAY M.D.

****

 

 

DATE OF CONSULTATION:  12/31/2017

 

YOB: 1950, 67 years old

 

REASON FOR CONSULTATION:

Stroke.

 

HISTORY OF PRESENT ILLNESS:

The patient is a 67 year-old man who came into the hospital on 12/28 with DKA,

new onset diabetes, renal insufficiency and closed head injury.  He had a fall

but no loss of consciousness.

PAST MEDICAL HISTORY:

No significant past medical history, upon arrival.

 

MEDICATIONS:

Only medicines at home were vitamins.

 

FAMILY HISTORY:

Diabetes in mother.  MI in father.

 

SOCIAL HISTORY:

He does not smoke, retired, .  No history of alcohol abuse.

 

The patient had an MRI ordered by the intensivist for his mental status change

and findings were subcentimeter acute left temporal lobe infarct with some

other sclerotic mild changes in additional branches bilaterally.

 

Carotid ultrasound did not show any acute findings either. Lipids were checked.

Echo is pending.  He is now on aspirin.

 

PHYSICAL EXAMINATION:

On exam his vitals, temperature 98, heart rate 95, respiratory rate 20, blood

pressure 146/69.

Neck:  Supple.  No appreciable bruits.

Heart:  Regular.  He is awake, alert.  Speech is intact.  Pupils reactive.

Visual fields full.  Face symmetric.  Tongue midline.  I do not see any drift

or leg lag.  DTRs 1+.  Cerebellar normal.  Toes are neutral.  Sensory seems

intact.  Gait is withheld.

 

LABORATORY DATA:

Reviewed.

 

He did have a sodium of 118 when he came in today, it is 133.  BUN is 39,

creatinine 1.13, GFR is 55, glucose 201 currently.  Glucose on admission was

1618.  His hemoglobin A1c is 11.4.

 

Lipid panel.  Triglyceride 77, cholesterol 58, LDL 19, HDL 23.2.

Tox screen negative.  Urine with moderate bacteria, culture was no growth.  No

growth in blood cultures either.

 

IMAGING STUDIES:

As stated.  MRI shows a subcentimeter left temporal infarct and atherosclerotic

mild disease in the distal branches bilaterally.

 

Carotid ultrasound, no significant disease.

 

IMPRESSION:

Small left temporal lobe subcentimeter and four point lacune.

 

RECOMMENDATIONS:

Recommend getting an echo.  Will put him on aspirin.  His lipids are

controlled.  He does not need a statin.  He needs proper diabetic control,

obviously some physical therapy evaluation and continue current medical care.

 

 

 

                              _________________________________

                              MD MACK Mahmood

D:  12/31/2017/1:02 PM

T:  12/31/2017/1:17 PM

Visit #:  [id number]

Job #:  [id number]

## 2018-01-01 VITALS
RESPIRATION RATE: 18 BRPM | SYSTOLIC BLOOD PRESSURE: 148 MMHG | OXYGEN SATURATION: 95 % | DIASTOLIC BLOOD PRESSURE: 78 MMHG | TEMPERATURE: 98 F | HEART RATE: 97 BPM

## 2018-01-01 VITALS
TEMPERATURE: 99.1 F | RESPIRATION RATE: 18 BRPM | DIASTOLIC BLOOD PRESSURE: 65 MMHG | OXYGEN SATURATION: 95 % | HEART RATE: 105 BPM | SYSTOLIC BLOOD PRESSURE: 128 MMHG

## 2018-01-01 VITALS — HEART RATE: 92 BPM

## 2018-01-01 VITALS
RESPIRATION RATE: 18 BRPM | HEART RATE: 89 BPM | SYSTOLIC BLOOD PRESSURE: 138 MMHG | DIASTOLIC BLOOD PRESSURE: 73 MMHG | OXYGEN SATURATION: 95 % | TEMPERATURE: 98.1 F

## 2018-01-01 VITALS
HEART RATE: 94 BPM | TEMPERATURE: 98.6 F | OXYGEN SATURATION: 95 % | DIASTOLIC BLOOD PRESSURE: 74 MMHG | SYSTOLIC BLOOD PRESSURE: 135 MMHG | RESPIRATION RATE: 18 BRPM

## 2018-01-01 VITALS — HEART RATE: 101 BPM

## 2018-01-01 VITALS
DIASTOLIC BLOOD PRESSURE: 64 MMHG | SYSTOLIC BLOOD PRESSURE: 127 MMHG | OXYGEN SATURATION: 95 % | HEART RATE: 93 BPM | RESPIRATION RATE: 19 BRPM | TEMPERATURE: 97.7 F

## 2018-01-01 VITALS
HEART RATE: 88 BPM | TEMPERATURE: 97.8 F | DIASTOLIC BLOOD PRESSURE: 72 MMHG | RESPIRATION RATE: 17 BRPM | OXYGEN SATURATION: 96 % | SYSTOLIC BLOOD PRESSURE: 136 MMHG

## 2018-01-01 VITALS — HEART RATE: 81 BPM

## 2018-01-01 VITALS — HEART RATE: 96 BPM

## 2018-01-01 VITALS — HEART RATE: 90 BPM

## 2018-01-01 LAB
BASOPHILS # BLD AUTO: 0 TH/MM3 (ref 0–0.2)
BASOPHILS NFR BLD: 0.1 % (ref 0–2)
BUN SERPL-MCNC: 19 MG/DL (ref 7–18)
CALCIUM SERPL-MCNC: 7.6 MG/DL (ref 8.5–10.1)
CHLORIDE SERPL-SCNC: 107 MEQ/L (ref 98–107)
CREAT SERPL-MCNC: 0.68 MG/DL (ref 0.6–1.3)
EOSINOPHIL # BLD: 0 TH/MM3 (ref 0–0.4)
EOSINOPHIL NFR BLD: 0.2 % (ref 0–4)
ERYTHROCYTE [DISTWIDTH] IN BLOOD BY AUTOMATED COUNT: 12.2 % (ref 11.6–17.2)
GFR SERPLBLD BASED ON 1.73 SQ M-ARVRAT: 116 ML/MIN (ref 89–?)
GLUCOSE SERPL-MCNC: 206 MG/DL (ref 74–106)
HCO3 BLD-SCNC: 24.6 MEQ/L (ref 21–32)
HCT VFR BLD CALC: 30.7 % (ref 39–51)
HGB BLD-MCNC: 10.9 GM/DL (ref 13–17)
LYMPHOCYTES # BLD AUTO: 0.8 TH/MM3 (ref 1–4.8)
LYMPHOCYTES NFR BLD AUTO: 7.3 % (ref 9–44)
MCH RBC QN AUTO: 30.4 PG (ref 27–34)
MCHC RBC AUTO-ENTMCNC: 35.4 % (ref 32–36)
MCV RBC AUTO: 85.8 FL (ref 80–100)
MONOCYTE #: 0.5 TH/MM3 (ref 0–0.9)
MONOCYTES NFR BLD: 4.5 % (ref 0–8)
NEUTROPHILS # BLD AUTO: 9.5 TH/MM3 (ref 1.8–7.7)
NEUTROPHILS NFR BLD AUTO: 87.9 % (ref 16–70)
PLATELET # BLD: 159 TH/MM3 (ref 150–450)
PMV BLD AUTO: 9.6 FL (ref 7–11)
RBC # BLD AUTO: 3.58 MIL/MM3 (ref 4.5–5.9)
SODIUM SERPL-SCNC: 139 MEQ/L (ref 136–145)
WBC # BLD AUTO: 10.8 TH/MM3 (ref 4–11)

## 2018-01-01 RX ADMIN — HUMAN INSULIN SCH: 100 INJECTION, SOLUTION SUBCUTANEOUS at 08:25

## 2018-01-01 RX ADMIN — TAZOBACTAM SODIUM AND PIPERACILLIN SODIUM SCH MLS/HR: 250; 2 INJECTION, SOLUTION INTRAVENOUS at 13:41

## 2018-01-01 RX ADMIN — FAMOTIDINE SCH MG: 20 TABLET, FILM COATED ORAL at 21:48

## 2018-01-01 RX ADMIN — STANDARDIZED SENNA CONCENTRATE AND DOCUSATE SODIUM SCH TAB: 8.6; 5 TABLET, FILM COATED ORAL at 09:42

## 2018-01-01 RX ADMIN — CLOTRIMAZOLE SCH MG: 10 LOZENGE ORAL at 17:54

## 2018-01-01 RX ADMIN — Medication SCH ML: at 09:00

## 2018-01-01 RX ADMIN — HUMAN INSULIN SCH: 100 INJECTION, SOLUTION SUBCUTANEOUS at 17:54

## 2018-01-01 RX ADMIN — TAZOBACTAM SODIUM AND PIPERACILLIN SODIUM SCH MLS/HR: 250; 2 INJECTION, SOLUTION INTRAVENOUS at 21:47

## 2018-01-01 RX ADMIN — TAMSULOSIN HYDROCHLORIDE SCH MG: 0.4 CAPSULE ORAL at 09:42

## 2018-01-01 RX ADMIN — HEPARIN SODIUM SCH UNITS: 10000 INJECTION, SOLUTION INTRAVENOUS; SUBCUTANEOUS at 13:42

## 2018-01-01 RX ADMIN — TAZOBACTAM SODIUM AND PIPERACILLIN SODIUM SCH MLS/HR: 250; 2 INJECTION, SOLUTION INTRAVENOUS at 03:22

## 2018-01-01 RX ADMIN — Medication SCH ML: at 09:42

## 2018-01-01 RX ADMIN — ASPIRIN SCH MG: 325 TABLET ORAL at 09:42

## 2018-01-01 RX ADMIN — HEPARIN SODIUM SCH UNITS: 10000 INJECTION, SOLUTION INTRAVENOUS; SUBCUTANEOUS at 06:55

## 2018-01-01 RX ADMIN — HEPARIN SODIUM SCH UNITS: 10000 INJECTION, SOLUTION INTRAVENOUS; SUBCUTANEOUS at 21:48

## 2018-01-01 RX ADMIN — HUMAN INSULIN SCH: 100 INJECTION, SOLUTION SUBCUTANEOUS at 12:29

## 2018-01-01 RX ADMIN — HUMAN INSULIN SCH: 100 INJECTION, SOLUTION SUBCUTANEOUS at 21:00

## 2018-01-01 RX ADMIN — ACYCLOVIR SCH UNITS: 800 TABLET ORAL at 23:05

## 2018-01-01 RX ADMIN — CLOTRIMAZOLE SCH MG: 10 LOZENGE ORAL at 13:42

## 2018-01-01 RX ADMIN — TAZOBACTAM SODIUM AND PIPERACILLIN SODIUM SCH MLS/HR: 250; 2 INJECTION, SOLUTION INTRAVENOUS at 08:25

## 2018-01-01 RX ADMIN — CLOTRIMAZOLE SCH MG: 10 LOZENGE ORAL at 09:43

## 2018-01-01 RX ADMIN — CLOTRIMAZOLE SCH MG: 10 LOZENGE ORAL at 21:48

## 2018-01-01 RX ADMIN — CLOTRIMAZOLE SCH MG: 10 LOZENGE ORAL at 06:00

## 2018-01-01 RX ADMIN — VANCOMYCIN HYDROCHLORIDE SCH MLS/HR: 1 INJECTION, SOLUTION INTRAVENOUS at 14:53

## 2018-01-01 RX ADMIN — STANDARDIZED SENNA CONCENTRATE AND DOCUSATE SODIUM SCH TAB: 8.6; 5 TABLET, FILM COATED ORAL at 21:48

## 2018-01-01 RX ADMIN — Medication SCH ML: at 21:48

## 2018-01-01 RX ADMIN — ACYCLOVIR SCH UNITS: 800 TABLET ORAL at 09:43

## 2018-01-01 RX ADMIN — FAMOTIDINE SCH MG: 20 TABLET, FILM COATED ORAL at 09:43

## 2018-01-01 NOTE — HHI.PR
Subjective


Remarks


Patient in bed feels very weak, did not walk with PT. No n/v/d/c. Denies cp, 

sob.





Objective


Vitals





Vital Signs








  Date Time  Temp Pulse Resp B/P (MAP) Pulse Ox O2 Delivery O2 Flow Rate FiO2


 


1/1/18 06:26 97.7 93 19 127/64 (85) 95   


 


1/1/18 01:16 98.1 89 18 138/73 (94) 95   


 


1/1/18 00:09  81      


 


12/31/17 20:30 98.0 89 19 160/70 (100) 95   


 


12/31/17 18:00  90      


 


12/31/17 17:00  110      


 


12/31/17 16:00  88      


 


12/31/17 16:00 98.0 88 22 120/58 (78) 94   


 


12/31/17 15:00  85      


 


12/31/17 14:00  84      


 


12/31/17 13:00  86      


 


12/31/17 12:00  87      


 


12/31/17 12:00 98.3 87 36 141/73 (95) 96   


 


12/31/17 11:00  86      


 


12/31/17 10:00  87      


 


12/31/17 09:00  95      














I/O      


 


 12/31/17 12/31/17 12/31/17 1/1/18 1/1/18 1/1/18





 07:00 15:00 23:00 07:00 15:00 23:00


 


Intake Total 520 ml 100 ml 950 ml 290 ml  


 


Output Total 1350 ml  1600 ml 125 ml  


 


Balance -830 ml 100 ml -650 ml 165 ml  


 


      


 


Intake Oral 120 ml  700 ml   


 


IV Total 400 ml 100 ml 250 ml 290 ml  


 


Output Urine Total 1350 ml  1600 ml 125 ml  


 


# Bowel Movements 0     








Result Diagram:  


1/1/18 0656                                                                    

            12/31/17 0955





Imaging





Last Impressions








Abdomen/Pelvis CT 12/31/17 0000 Signed





Impressions: 





 Service Date/Time:  Sunday, December 31, 2017 12:49 - CONCLUSION:  1. 

Persistent 





 linear low density area seen in the left base of gas which appears less 





 prominent. An abscess cannot be excluded. 2. Dilatation of the collecting 





 systems and ureters bilaterally being worse on the left. No obstructing stones 





 are seen. This could reflect outlet obstruction at the bladder. The prostate 





 does not appear enlarged. The bladder is diffusely thickened. 3. Tiny 





 nonobstructing left renal stone. 4. Bilateral mild pleural effusions and a 

mild 





 amount of pericardial fluid seen. 5. Suspected anasarca.     Jd Araujo MD 


 


Head Magnetic Resonance Angiography 12/30/17 0900 Signed





Impressions: 





 Service Date/Time:  Saturday, December 30, 2017 21:09 - CONCLUSION: Slight 





 atherosclerotic changes of distal branches bilaterally, otherwise 

unremarkable.  





   ROBBY Mack MD 


 


Brain MRI 12/30/17 0900 Signed





Impressions: 





 Service Date/Time:  Saturday, December 30, 2017 21:09 - CONCLUSION: Possible 





 tiny subcentimeter area of acute infarction left posterior temporal lobe on 





 follow up is suggested.     ROBBY Mack MD 


 


Chest X-Ray 12/29/17 0927 Signed





Impressions: 





 Service Date/Time:  Friday, December 29, 2017 09:31 - CONCLUSION:  MD 


 


Carotid Artery Ultrasound 12/29/17 0000 Signed





Impressions: 





 Service Date/Time:  Friday, December 29, 2017 08:18 - CONCLUSION:  No evidence 





 of flow-limiting carotid stenosis.      Jd Steward MD 


 


Scrotum Ultrasound 12/28/17 0000 Signed





Impressions: 





 Service Date/Time:  Thursday, December 28, 2017 21:57 - CONCLUSION:  Prominent 





 scrotal edema. Testicles are unremarkable     Jd Steward MD 


 


Head CT 12/28/17 0000 Signed





Impressions: 





 Service Date/Time:  Thursday, December 28, 2017 22:28 - CONCLUSION: 





 Age-indeterminate left thalamic lacunar infarct. No other acute intracranial 





 findings identified.     Henry Zepeda MD 


 


Cervical Spine CT 12/28/17 0000 Signed





Impressions: 





 Service Date/Time:  Thursday, December 28, 2017 22:29 - CONCLUSION:  No 

evidence 





 of fracture. Multilevel degenerative findings.     Henry Zepeda MD 








Objective Remarks


GENERAL: 66 yo male sitting up in ISC bed. 


CARDIOVASCULAR: Regular rate and rhythm, no murmurs, rubs, gallops


RESPIRATORY: Breathing comfortably with no accessory muscle use.  Clear to 

auscultation bilaterally.  On RA.   No wheezes, Rales, rhonchi.


GASTROINTESTINAL: Abdomen soft, nondistended, bowel sounds present, no rebound 

or guarding.  He has edema overlying lower abdomen/pelvis and this extends up 

into the RLQ with edema there. The erythema that was once there is improved. 

The edema is improving too.  The edematous area is mildly tender, but much 

better than yesterday.  There is no mass/fluctuance.  No CVAT. 


SKIN/SCROTUM:  warm, dry, well perfused. .   The scrotum is diffusely swollen 

and the skin is excoriated and weeping serous fluid. No fluctuant mass 

appreciated. No necrotizing skin lesion noted. No perianal erythema. I do not 

appreciate any fluctuance on the penis. 


MUSCULOSKELETAL: There is mild dependent edema of right upper thigh.  No 

obvious deformities. 


NEUROLOGICAL: Awake and alert during answering questions appropriately..  

Normal speech.  Moves all extremities without focal deficit.


Date of Insertion:  Dec 29, 2017


Date of Insertion:  Dec 29, 2017


Line:  Central Venous Catheter


Side:  Right


Location:  Internal, Jugular





A/P


Assessment and Plan


Status post mechanical fall


Age indeterminate Left thalamic lacunar infarct





CT brain -revealed an age-indeterminate left thalamic infarct.


CT Cspine -degenerative changes noted.


Carotid Dopplers - no flow limiting stenosis


2-D echo no evidence of embolic source


MRI/A brain - possible tiny subcentimeter area acute infarction left posterior 

temporal lobe. MRA with distal atherosclerotic changes. 


Lipid panel revealed a low cholesterol/HDL.


Initiate antiplatelet therapy with  mg po daily. Discussed with urology, 

should not affect ability to perform debridement. 





Currently on acetaminophen 650 mg every 6 hours when necessary fever/pain 1- 2.


Hydrocodone/acetaminophen 5/325 one tablet every 4 hours when necessary pain 3-5


Morphine sulfate 2 mg IV every 2 hours when necessary breakthrough pain





Out of bed daily, PT/OT. 





RESP:


On RA. 


Incentive spirometry while awake


Chest x-ray 12/29 revealed no acute cardiopulmonary disease





CV:


Hypotension - severe sepsis


Lactic acidemia


S/p aggressive fluid resusc for hyperosmolar hyperglycemic state.


Did not require vasopressors.


Ace I on hold upon admission due to GRACIELA. 


Troponin 0.02


2-D echocardiogram - EF 60-65%.  Pulmonary artery pressure 35 mmHg.  Small 

pericardial effusion without hemodynamic compromise.





GI: 


Hypoalbuminemia


Elevated alkaline phosphatase


Speech therapy advance to soft diet and liquids on 12/30 2000 ADA


Famotidine 10 mg twice a day for GI prophylaxis


Docusate sodium/senna 1 tablet twice a day for bowel regimen





FEN/RENAL/: 





Pseudohyponatremia, resolved. 


Acute kidney injury


Urinary obstruction. Bilateral hydronephrosis/hydroureter -


Penis fluid collection - possible early abscess per CT report





Scrotal ultrasound -normal testes.  No flow limiting effect noted.  Diffuse 

scrotal edema.


CT abdomen/pelvis - Marked scrotal skin thickening and scrotal edema. No gas 

identified in the soft tissues.  Elongated fluid density within the left side 

of the base of the penis 5.5 x 1 cm may represent early abscess formation.  

Diffuse severe superficial soft tissue edema of the lower abdomen, pelvis, and 

proximal lower extremities.  Moderate bilateral hydronephrosis and diffuse 

hydroureter as well as marked distention of urinary bladder. Findings suggest 

possible bladder outlet obstruction. Prostate measures 4.1 x 2.9 cm in 

transverse dimensions. 


Maintain 14 Cymro coud catheter


Urology consult Dr. Foster recommends continue with Bautista catheter to gravity 

drainage until outpatient urologic workup can be completed.


   will eventually require cystoscopy and possibly a full urodynamics 

evaluation as an outpatient.


   Pitting CT  scan of the abdomen and pelvis  12/31.


   Discussed with Dr. Renteria. 


Urine eosinophils 1-2


 Start tamulosin 0.4 mg po daily. 





ID:





Thrush


Bacteriuria likely UTI


Scrotal cellulitis





Day #4 vancomycin, piperacillin/tazobactam and clindamycin for scrotal 

cellulitis/urinary tract infection. If CT scan improved, would d/c clindamycin. 


Fluconazole 100 mg IV x1.  Given 12/29


Clotrimazole 10 mg 5 times daily for oral thrush





Pertinent cultures





12/29 - blood cultures 2 - neg


12/29 - urine culture - neg





HEME: 





Leukocytosis - neutrophil predominant


Normocytic anemia





Monitor CBC daily.  Follow trends


Coags c/w low grade DIC, fibrinogen preserved. 





ENDO:





Hyperosmolar hyperglycemic nonketotic state, resolved





off  insulin drip


On medium dose insulin sliding scale. Detemir 5 subcut q12. 





Follow-up hemoglobin A1c


TSH 0.432





PROPH: 





SCD//Heparin subcutaneous for DVT prophylaxis


Famotidine for stress ulcer prophylaxis.





ACCESS:  RIJ CVL 12/29 #3. D/c CVL today 





Per  Dr Renteria urology not intending surgical debridement.


Consult PT/OT





DC plan: pending improvement











Pham Owen MD Jan 1, 2018 08:17

## 2018-01-02 VITALS
SYSTOLIC BLOOD PRESSURE: 123 MMHG | RESPIRATION RATE: 20 BRPM | TEMPERATURE: 98.4 F | HEART RATE: 80 BPM | OXYGEN SATURATION: 97 % | DIASTOLIC BLOOD PRESSURE: 67 MMHG

## 2018-01-02 VITALS
RESPIRATION RATE: 20 BRPM | HEART RATE: 89 BPM | OXYGEN SATURATION: 93 % | DIASTOLIC BLOOD PRESSURE: 75 MMHG | SYSTOLIC BLOOD PRESSURE: 148 MMHG | TEMPERATURE: 98.3 F

## 2018-01-02 VITALS
SYSTOLIC BLOOD PRESSURE: 138 MMHG | HEART RATE: 90 BPM | RESPIRATION RATE: 18 BRPM | TEMPERATURE: 98.2 F | DIASTOLIC BLOOD PRESSURE: 70 MMHG | OXYGEN SATURATION: 95 %

## 2018-01-02 VITALS
SYSTOLIC BLOOD PRESSURE: 149 MMHG | HEART RATE: 86 BPM | RESPIRATION RATE: 18 BRPM | TEMPERATURE: 97.4 F | DIASTOLIC BLOOD PRESSURE: 72 MMHG | OXYGEN SATURATION: 96 %

## 2018-01-02 VITALS — HEART RATE: 90 BPM

## 2018-01-02 VITALS
DIASTOLIC BLOOD PRESSURE: 62 MMHG | OXYGEN SATURATION: 94 % | HEART RATE: 88 BPM | RESPIRATION RATE: 20 BRPM | TEMPERATURE: 98 F | SYSTOLIC BLOOD PRESSURE: 122 MMHG

## 2018-01-02 VITALS
HEART RATE: 84 BPM | DIASTOLIC BLOOD PRESSURE: 67 MMHG | TEMPERATURE: 98.7 F | OXYGEN SATURATION: 98 % | SYSTOLIC BLOOD PRESSURE: 143 MMHG | RESPIRATION RATE: 20 BRPM

## 2018-01-02 LAB
BASOPHILS # BLD AUTO: 0 TH/MM3 (ref 0–0.2)
BASOPHILS NFR BLD: 0.1 % (ref 0–2)
BUN SERPL-MCNC: 19 MG/DL (ref 7–18)
CALCIUM SERPL-MCNC: 7.6 MG/DL (ref 8.5–10.1)
CHLORIDE SERPL-SCNC: 103 MEQ/L (ref 98–107)
CREAT SERPL-MCNC: 0.59 MG/DL (ref 0.6–1.3)
EOSINOPHIL # BLD: 0.1 TH/MM3 (ref 0–0.4)
EOSINOPHIL NFR BLD: 1.1 % (ref 0–4)
ERYTHROCYTE [DISTWIDTH] IN BLOOD BY AUTOMATED COUNT: 12.5 % (ref 11.6–17.2)
GFR SERPLBLD BASED ON 1.73 SQ M-ARVRAT: 137 ML/MIN (ref 89–?)
GLUCOSE SERPL-MCNC: 110 MG/DL (ref 74–106)
HCO3 BLD-SCNC: 28.8 MEQ/L (ref 21–32)
HCT VFR BLD CALC: 28.4 % (ref 39–51)
HGB BLD-MCNC: 10.3 GM/DL (ref 13–17)
LYMPHOCYTES # BLD AUTO: 1 TH/MM3 (ref 1–4.8)
LYMPHOCYTES NFR BLD AUTO: 12.3 % (ref 9–44)
MAGNESIUM SERPL-MCNC: 1.5 MG/DL (ref 1.5–2.5)
MCH RBC QN AUTO: 30.6 PG (ref 27–34)
MCHC RBC AUTO-ENTMCNC: 36 % (ref 32–36)
MCV RBC AUTO: 84.9 FL (ref 80–100)
MONOCYTE #: 0.6 TH/MM3 (ref 0–0.9)
MONOCYTES NFR BLD: 7 % (ref 0–8)
NEUTROPHILS # BLD AUTO: 6.3 TH/MM3 (ref 1.8–7.7)
NEUTROPHILS NFR BLD AUTO: 79.5 % (ref 16–70)
PLATELET # BLD: 159 TH/MM3 (ref 150–450)
PMV BLD AUTO: 9.5 FL (ref 7–11)
RBC # BLD AUTO: 3.35 MIL/MM3 (ref 4.5–5.9)
SODIUM SERPL-SCNC: 139 MEQ/L (ref 136–145)
WBC # BLD AUTO: 8 TH/MM3 (ref 4–11)

## 2018-01-02 RX ADMIN — HUMAN INSULIN SCH: 100 INJECTION, SOLUTION SUBCUTANEOUS at 07:54

## 2018-01-02 RX ADMIN — ASPIRIN SCH MG: 325 TABLET ORAL at 08:04

## 2018-01-02 RX ADMIN — VANCOMYCIN HYDROCHLORIDE SCH MLS/HR: 1 INJECTION, SOLUTION INTRAVENOUS at 02:29

## 2018-01-02 RX ADMIN — HUMAN INSULIN SCH: 100 INJECTION, SOLUTION SUBCUTANEOUS at 12:34

## 2018-01-02 RX ADMIN — CLOTRIMAZOLE SCH MG: 10 LOZENGE ORAL at 10:54

## 2018-01-02 RX ADMIN — STANDARDIZED SENNA CONCENTRATE AND DOCUSATE SODIUM SCH TAB: 8.6; 5 TABLET, FILM COATED ORAL at 08:04

## 2018-01-02 RX ADMIN — VANCOMYCIN HYDROCHLORIDE SCH MLS/HR: 1 INJECTION, SOLUTION INTRAVENOUS at 15:00

## 2018-01-02 RX ADMIN — CLOTRIMAZOLE SCH MG: 10 LOZENGE ORAL at 14:00

## 2018-01-02 RX ADMIN — HUMAN INSULIN SCH: 100 INJECTION, SOLUTION SUBCUTANEOUS at 21:00

## 2018-01-02 RX ADMIN — Medication SCH ML: at 08:03

## 2018-01-02 RX ADMIN — HEPARIN SODIUM SCH UNITS: 10000 INJECTION, SOLUTION INTRAVENOUS; SUBCUTANEOUS at 23:09

## 2018-01-02 RX ADMIN — FAMOTIDINE SCH MG: 20 TABLET, FILM COATED ORAL at 08:04

## 2018-01-02 RX ADMIN — FAMOTIDINE SCH MG: 20 TABLET, FILM COATED ORAL at 23:09

## 2018-01-02 RX ADMIN — CLOTRIMAZOLE SCH MG: 10 LOZENGE ORAL at 05:58

## 2018-01-02 RX ADMIN — HEPARIN SODIUM SCH UNITS: 10000 INJECTION, SOLUTION INTRAVENOUS; SUBCUTANEOUS at 05:58

## 2018-01-02 RX ADMIN — ACYCLOVIR SCH UNITS: 800 TABLET ORAL at 08:03

## 2018-01-02 RX ADMIN — TAZOBACTAM SODIUM AND PIPERACILLIN SODIUM SCH MLS/HR: 250; 2 INJECTION, SOLUTION INTRAVENOUS at 02:04

## 2018-01-02 RX ADMIN — TAZOBACTAM SODIUM AND PIPERACILLIN SODIUM SCH MLS/HR: 250; 2 INJECTION, SOLUTION INTRAVENOUS at 23:08

## 2018-01-02 RX ADMIN — TAMSULOSIN HYDROCHLORIDE SCH MG: 0.4 CAPSULE ORAL at 08:04

## 2018-01-02 RX ADMIN — CLOTRIMAZOLE SCH MG: 10 LOZENGE ORAL at 23:09

## 2018-01-02 RX ADMIN — Medication SCH ML: at 23:09

## 2018-01-02 RX ADMIN — STANDARDIZED SENNA CONCENTRATE AND DOCUSATE SODIUM SCH TAB: 8.6; 5 TABLET, FILM COATED ORAL at 23:09

## 2018-01-02 RX ADMIN — TAZOBACTAM SODIUM AND PIPERACILLIN SODIUM SCH MLS/HR: 250; 2 INJECTION, SOLUTION INTRAVENOUS at 08:03

## 2018-01-02 RX ADMIN — CLOTRIMAZOLE SCH MG: 10 LOZENGE ORAL at 17:21

## 2018-01-02 RX ADMIN — HEPARIN SODIUM SCH UNITS: 10000 INJECTION, SOLUTION INTRAVENOUS; SUBCUTANEOUS at 14:24

## 2018-01-02 RX ADMIN — HUMAN INSULIN SCH: 100 INJECTION, SOLUTION SUBCUTANEOUS at 17:22

## 2018-01-02 RX ADMIN — ACYCLOVIR SCH UNITS: 800 TABLET ORAL at 23:10

## 2018-01-02 RX ADMIN — TAZOBACTAM SODIUM AND PIPERACILLIN SODIUM SCH MLS/HR: 250; 2 INJECTION, SOLUTION INTRAVENOUS at 14:25

## 2018-01-02 NOTE — HHI.PR
Subjective


Remarks


The patient is sitting in bed, she feels very tired.  Says he needed assistance 

by 2 people to get out of the bed.  No chest pain or shortness of breath 

however short of breath with activity clear.  The patient.  No nausea or 

vomiting diarrhea or constipation.  Patient is however not able to eat much.  

No abdominal pain.





Objective


Vitals





Vital Signs








  Date Time  Temp Pulse Resp B/P (MAP) Pulse Ox O2 Delivery O2 Flow Rate FiO2


 


1/2/18 08:16 98.3 89 20 148/75 (99) 93   


 


1/2/18 04:00 97.4 86 18 149/72 (97) 96   


 


1/2/18 00:00 98.2 90 18 138/70 (92) 95   


 


1/1/18 21:00  90      


 


1/1/18 20:00 97.8 88 17 136/72 (93) 96   


 


1/1/18 16:30 98.6 94 18 135/74 (94) 95   


 


1/1/18 16:00  92      


 


1/1/18 12:27  101      


 


1/1/18 12:05 99.1 105 18 128/65 (86) 95   














I/O      


 


 1/1/18 1/1/18 1/1/18 1/2/18 1/2/18 1/2/18





 07:00 15:00 23:00 07:00 15:00 23:00


 


Intake Total 290 ml 650 ml 250 ml   


 


Output Total 125 ml 950 ml 2000 ml  1000 ml 


 


Balance 165 ml -300 ml -1750 ml  -1000 ml 


 


      


 


Intake Oral  600 ml    


 


IV Total 290 ml 50 ml 250 ml   


 


Output Urine Total 125 ml 950 ml 2000 ml  1000 ml 


 


# Bowel Movements   0  0 








Result Diagram:  


1/2/18 0654                                                                    

            1/2/18 0654





Imaging





Last Impressions








Abdomen/Pelvis CT 12/31/17 0000 Signed





Impressions: 





 Service Date/Time:  Sunday, December 31, 2017 12:49 - CONCLUSION:  1. 

Persistent 





 linear low density area seen in the left base of gas which appears less 





 prominent. An abscess cannot be excluded. 2. Dilatation of the collecting 





 systems and ureters bilaterally being worse on the left. No obstructing stones 





 are seen. This could reflect outlet obstruction at the bladder. The prostate 





 does not appear enlarged. The bladder is diffusely thickened. 3. Tiny 





 nonobstructing left renal stone. 4. Bilateral mild pleural effusions and a 

mild 





 amount of pericardial fluid seen. 5. Suspected anasarca.     Jd Araujo MD 


 


Head Magnetic Resonance Angiography 12/30/17 0900 Signed





Impressions: 





 Service Date/Time:  Saturday, December 30, 2017 21:09 - CONCLUSION: Slight 





 atherosclerotic changes of distal branches bilaterally, otherwise 

unremarkable.  





   ROBBY Mack MD 


 


Brain MRI 12/30/17 0900 Signed





Impressions: 





 Service Date/Time:  Saturday, December 30, 2017 21:09 - CONCLUSION: Possible 





 tiny subcentimeter area of acute infarction left posterior temporal lobe on 





 follow up is suggested.     ROBBY Mack MD 


 


Chest X-Ray 12/29/17 0927 Signed





Impressions: 





 Service Date/Time:  Friday, December 29, 2017 09:31 - CONCLUSION:  MD 


 


Carotid Artery Ultrasound 12/29/17 0000 Signed





Impressions: 





 Service Date/Time:  Friday, December 29, 2017 08:18 - CONCLUSION:  No evidence 





 of flow-limiting carotid stenosis.      Jd Steward MD 


 


Scrotum Ultrasound 12/28/17 0000 Signed





Impressions: 





 Service Date/Time:  Thursday, December 28, 2017 21:57 - CONCLUSION:  Prominent 





 scrotal edema. Testicles are unremarkable     Jd Steward MD 


 


Head CT 12/28/17 0000 Signed





Impressions: 





 Service Date/Time:  Thursday, December 28, 2017 22:28 - CONCLUSION: 





 Age-indeterminate left thalamic lacunar infarct. No other acute intracranial 





 findings identified.     Henry Zeepda MD 


 


Cervical Spine CT 12/28/17 0000 Signed





Impressions: 





 Service Date/Time:  Thursday, December 28, 2017 22:29 - CONCLUSION:  No 

evidence 





 of fracture. Multilevel degenerative findings.     Henry Zepeda MD 








Objective Remarks


GENERAL: 66 yo male sitting up in ISC bed. 


CARDIOVASCULAR: Regular rate and rhythm, no murmurs, rubs, gallops


RESPIRATORY: Breathing comfortably with no accessory muscle use.  Clear to 

auscultation bilaterally.  On RA.   No wheezes, Rales, rhonchi.


GASTROINTESTINAL: Abdomen soft, nondistended, bowel sounds present, no rebound 

or guarding.  He has edema overlying lower abdomen/pelvis and this extends up 

into the RLQ with edema there. The erythema that was once there is improved. 

The edema is improving too.  The edematous area is mildly tender, but much 

better than yesterday.  There is no mass/fluctuance.  No CVAT. 


SKIN/SCROTUM:  warm, dry, well perfused. .   The scrotum is diffusely swollen 

and the skin is excoriated and weeping serous fluid. No fluctuant mass 

appreciated. No necrotizing skin lesion noted. No perianal erythema. I do not 

appreciate any fluctuance on the penis. 


MUSCULOSKELETAL: There is mild dependent edema of right upper thigh.  No 

obvious deformities. 


NEUROLOGICAL: Awake and alert during answering questions appropriately..  

Normal speech.  Moves all extremities without focal deficit.


Date of Insertion:  Dec 29, 2017


Date of Insertion:  Dec 29, 2017


Line:  Central Venous Catheter


Side:  Right


Location:  Internal, Jugular





A/P


Assessment and Plan


Status post mechanical fall


Age indeterminate Left thalamic lacunar infarct





CT brain -revealed an age-indeterminate left thalamic infarct.


CT Cspine -degenerative changes noted.


Carotid Dopplers - no flow limiting stenosis


2-D echo no evidence of embolic source


MRI/A brain - possible tiny subcentimeter area acute infarction left posterior 

temporal lobe. MRA with distal atherosclerotic changes. 


Lipid panel revealed a low cholesterol/HDL.


Initiate antiplatelet therapy with  mg po daily. Discussed with urology, 

should not affect ability to perform debridement. 





Currently on acetaminophen 650 mg every 6 hours when necessary fever/pain 1- 2.


Hydrocodone/acetaminophen 5/325 one tablet every 4 hours when necessary pain 3-5


Morphine sulfate 2 mg IV every 2 hours when necessary breakthrough pain





Out of bed daily, PT/OT. 





RESP:


On RA. 


Incentive spirometry while awake


Chest x-ray 12/29 revealed no acute cardiopulmonary disease





CV:


Hypotension - severe sepsis


Lactic acidemia


S/p aggressive fluid resusc for hyperosmolar hyperglycemic state.


Did not require vasopressors.


Ace I on hold upon admission due to GRACIELA. 


Troponin 0.02


2-D echocardiogram - EF 60-65%.  Pulmonary artery pressure 35 mmHg.  Small 

pericardial effusion without hemodynamic compromise.





GI: 


Hypoalbuminemia


Elevated alkaline phosphatase


Speech therapy advance to soft diet and liquids on 12/30 2000 ADA


Famotidine 10 mg twice a day for GI prophylaxis


Docusate sodium/senna 1 tablet twice a day for bowel regimen





FEN/RENAL/: 





Pseudohyponatremia, resolved. 


Acute kidney injury


Urinary obstruction. Bilateral hydronephrosis/hydroureter -


Penis fluid collection - possible early abscess per CT report





Scrotal ultrasound -normal testes.  No flow limiting effect noted.  Diffuse 

scrotal edema.


CT abdomen/pelvis - Marked scrotal skin thickening and scrotal edema. No gas 

identified in the soft tissues.  Elongated fluid density within the left side 

of the base of the penis 5.5 x 1 cm may represent early abscess formation.  

Diffuse severe superficial soft tissue edema of the lower abdomen, pelvis, and 

proximal lower extremities.  Moderate bilateral hydronephrosis and diffuse 

hydroureter as well as marked distention of urinary bladder. Findings suggest 

possible bladder outlet obstruction. Prostate measures 4.1 x 2.9 cm in 

transverse dimensions. 


Maintain 14 Wolof coud catheter


Urology consult Dr. Foster recommends continue with Bautista catheter to gravity 

drainage until outpatient urologic workup can be completed.


   will eventually require cystoscopy and possibly a full urodynamics 

evaluation as an outpatient.


   Pitting CT  scan of the abdomen and pelvis  12/31.


   Discussed with Dr. Renteria. 


Urine eosinophils 1-2


 Start tamulosin 0.4 mg po daily. 





ID:





Thrush


Bacteriuria likely UTI


Scrotal cellulitis





Day #4 vancomycin, piperacillin/tazobactam and clindamycin for scrotal 

cellulitis/urinary tract infection. If CT scan improved, would d/c clindamycin. 


Fluconazole 100 mg IV x1.  Given 12/29


Clotrimazole 10 mg 5 times daily for oral thrush





Pertinent cultures





12/29 - blood cultures 2 - neg


12/29 - urine culture - neg





HEME: 





Leukocytosis - neutrophil predominant


Normocytic anemia





Monitor CBC daily.  Follow trends


Coags c/w low grade DIC, fibrinogen preserved. 





ENDO:





Hyperosmolar hyperglycemic nonketotic state, resolved





off  insulin drip


On medium dose insulin sliding scale. Detemir 5 subcut q12. 





Follow-up hemoglobin A1c


TSH 0.432





PROPH: 





SCD//Heparin subcutaneous for DVT prophylaxis


Famotidine for stress ulcer prophylaxis.





ACCESS:  RIJ CVL 12/29 #3. D/c CVL 





Per  Dr Renteria urology not intending surgical debridement.


Consult PT/OT, recommends PT at home.  However the patient and family doesn't 

feel comfortable to go home because he needed 2 people to assist to get out of 

the bed will have PT reevaluate patient.





DC plan: pending improvement and clearance by consultants











Pham Owen MD Jan 2, 2018 08:35

## 2018-01-03 VITALS
RESPIRATION RATE: 20 BRPM | OXYGEN SATURATION: 92 % | DIASTOLIC BLOOD PRESSURE: 64 MMHG | TEMPERATURE: 99.5 F | SYSTOLIC BLOOD PRESSURE: 126 MMHG | HEART RATE: 83 BPM

## 2018-01-03 VITALS
OXYGEN SATURATION: 94 % | HEART RATE: 89 BPM | DIASTOLIC BLOOD PRESSURE: 70 MMHG | SYSTOLIC BLOOD PRESSURE: 136 MMHG | TEMPERATURE: 99.1 F | RESPIRATION RATE: 17 BRPM

## 2018-01-03 VITALS
TEMPERATURE: 97.5 F | HEART RATE: 82 BPM | DIASTOLIC BLOOD PRESSURE: 79 MMHG | SYSTOLIC BLOOD PRESSURE: 143 MMHG | OXYGEN SATURATION: 93 % | RESPIRATION RATE: 20 BRPM

## 2018-01-03 VITALS
DIASTOLIC BLOOD PRESSURE: 56 MMHG | RESPIRATION RATE: 20 BRPM | TEMPERATURE: 98.4 F | HEART RATE: 100 BPM | OXYGEN SATURATION: 94 % | SYSTOLIC BLOOD PRESSURE: 96 MMHG

## 2018-01-03 VITALS
OXYGEN SATURATION: 94 % | SYSTOLIC BLOOD PRESSURE: 144 MMHG | DIASTOLIC BLOOD PRESSURE: 70 MMHG | RESPIRATION RATE: 18 BRPM | HEART RATE: 81 BPM | TEMPERATURE: 98.6 F

## 2018-01-03 VITALS
HEART RATE: 80 BPM | OXYGEN SATURATION: 96 % | SYSTOLIC BLOOD PRESSURE: 124 MMHG | RESPIRATION RATE: 18 BRPM | DIASTOLIC BLOOD PRESSURE: 70 MMHG | TEMPERATURE: 98.1 F

## 2018-01-03 VITALS — HEART RATE: 86 BPM

## 2018-01-03 VITALS — HEART RATE: 85 BPM

## 2018-01-03 VITALS — HEART RATE: 87 BPM

## 2018-01-03 LAB
BUN SERPL-MCNC: 18 MG/DL (ref 7–18)
CALCIUM SERPL-MCNC: 7.1 MG/DL (ref 8.5–10.1)
CALCIUM TP COR SERPL-MCNC: 8.4 MG/DL (ref 8.5–10.1)
CHLORIDE SERPL-SCNC: 94 MEQ/L (ref 98–107)
CREAT SERPL-MCNC: 0.73 MG/DL (ref 0.6–1.3)
GFR SERPLBLD BASED ON 1.73 SQ M-ARVRAT: 107 ML/MIN (ref 89–?)
GLUCOSE SERPL-MCNC: 230 MG/DL (ref 74–106)
HCO3 BLD-SCNC: 30.5 MEQ/L (ref 21–32)
PROT SERPL-MCNC: 4.7 GM/DL (ref 6.4–8.2)
SODIUM SERPL-SCNC: 131 MEQ/L (ref 136–145)

## 2018-01-03 RX ADMIN — CLOTRIMAZOLE SCH MG: 10 LOZENGE ORAL at 05:46

## 2018-01-03 RX ADMIN — ASPIRIN SCH MG: 325 TABLET ORAL at 09:14

## 2018-01-03 RX ADMIN — ACYCLOVIR SCH UNITS: 800 TABLET ORAL at 22:56

## 2018-01-03 RX ADMIN — Medication SCH ML: at 09:16

## 2018-01-03 RX ADMIN — HUMAN INSULIN SCH: 100 INJECTION, SOLUTION SUBCUTANEOUS at 17:26

## 2018-01-03 RX ADMIN — HUMAN INSULIN SCH: 100 INJECTION, SOLUTION SUBCUTANEOUS at 09:15

## 2018-01-03 RX ADMIN — CIPROFLOXACIN HYDROCHLORIDE SCH MG: 500 TABLET, FILM COATED ORAL at 22:49

## 2018-01-03 RX ADMIN — ACYCLOVIR SCH UNITS: 800 TABLET ORAL at 09:15

## 2018-01-03 RX ADMIN — FAMOTIDINE SCH MG: 20 TABLET, FILM COATED ORAL at 09:14

## 2018-01-03 RX ADMIN — TAZOBACTAM SODIUM AND PIPERACILLIN SODIUM SCH MLS/HR: 250; 2 INJECTION, SOLUTION INTRAVENOUS at 09:13

## 2018-01-03 RX ADMIN — CLOTRIMAZOLE SCH MG: 10 LOZENGE ORAL at 14:00

## 2018-01-03 RX ADMIN — TAZOBACTAM SODIUM AND PIPERACILLIN SODIUM SCH MLS/HR: 250; 2 INJECTION, SOLUTION INTRAVENOUS at 14:02

## 2018-01-03 RX ADMIN — CLOTRIMAZOLE SCH MG: 10 LOZENGE ORAL at 22:49

## 2018-01-03 RX ADMIN — STANDARDIZED SENNA CONCENTRATE AND DOCUSATE SODIUM SCH TAB: 8.6; 5 TABLET, FILM COATED ORAL at 22:49

## 2018-01-03 RX ADMIN — TAMSULOSIN HYDROCHLORIDE SCH MG: 0.4 CAPSULE ORAL at 09:14

## 2018-01-03 RX ADMIN — CLOTRIMAZOLE SCH MG: 10 LOZENGE ORAL at 09:14

## 2018-01-03 RX ADMIN — HEPARIN SODIUM SCH UNITS: 10000 INJECTION, SOLUTION INTRAVENOUS; SUBCUTANEOUS at 05:46

## 2018-01-03 RX ADMIN — HUMAN INSULIN SCH: 100 INJECTION, SOLUTION SUBCUTANEOUS at 22:56

## 2018-01-03 RX ADMIN — HEPARIN SODIUM SCH UNITS: 10000 INJECTION, SOLUTION INTRAVENOUS; SUBCUTANEOUS at 14:03

## 2018-01-03 RX ADMIN — HEPARIN SODIUM SCH UNITS: 10000 INJECTION, SOLUTION INTRAVENOUS; SUBCUTANEOUS at 22:49

## 2018-01-03 RX ADMIN — Medication SCH ML: at 09:00

## 2018-01-03 RX ADMIN — FAMOTIDINE SCH MG: 20 TABLET, FILM COATED ORAL at 22:49

## 2018-01-03 RX ADMIN — CLOTRIMAZOLE SCH MG: 10 LOZENGE ORAL at 17:27

## 2018-01-03 RX ADMIN — TAZOBACTAM SODIUM AND PIPERACILLIN SODIUM SCH MLS/HR: 250; 2 INJECTION, SOLUTION INTRAVENOUS at 03:29

## 2018-01-03 RX ADMIN — Medication SCH ML: at 22:50

## 2018-01-03 RX ADMIN — STANDARDIZED SENNA CONCENTRATE AND DOCUSATE SODIUM SCH TAB: 8.6; 5 TABLET, FILM COATED ORAL at 09:14

## 2018-01-03 RX ADMIN — HUMAN INSULIN SCH: 100 INJECTION, SOLUTION SUBCUTANEOUS at 12:25

## 2018-01-03 RX ADMIN — VANCOMYCIN HYDROCHLORIDE SCH MLS/HR: 1 INJECTION, SOLUTION INTRAVENOUS at 04:12

## 2018-01-03 NOTE — HHI.FF
Face to Face Verification


Diagnosis:  


(1) Hydronephrosis


(2) Cellulitis of scrotum


(3) Urinary retention


(4) DKA (diabetic ketoacidoses)


(5) Renal insufficiency


(6) Lacunar stroke


(7) Scrotal edema


(8) GRACIELA (acute kidney injury)


(9) Type 2 diabetes mellitus with hyperosmolar nonketotic hyperglycemia


Physical Therapy


Order:  Evaluate and Treat





Home Health Nursing








Order: Medical education





 Signs/symptoms of disease process





 Medication education-adverse effect





 Nursing assessment with vital signs

















I have seen patient Aris Prescott on 1/3/18. My clinical findings support the 

need for the requested home health care services because:








 Ltd mobility - disease progression





 Patient has SOB














I certify that my clinical findings support that this patient is homebound 

because:








 Post-op weakness





 Unsteady gait/balance

















Pham Owen MD Paul 3, 2018 10:21

## 2018-01-03 NOTE — MB
cc:

AMELIA MEDINA MD

****

 

DATE OF CONSULTATION:

01/03/2018

 

REQUESTING PHYSICIAN:

Dr. Owen.

 

REASON FOR CONSULTATION:

Scrotal cellulitis for antibiotics for DC.

 

HISTORY OF PRESENT ILLNESS

This is a 67-year-old white male was admitted to the hospital on 12/28.  The

patient reportedly had a weakness and fall prior to coming to the emergency

department.  He noted that he had scrotal swelling over 2-3 days before

admission and he was evaluated by urology.

 

We recommended treating the patient with antibiotics. The patient has a Bautista

catheter in place.  Blood cultures were negative and urine culture was also

negative.  His white count on admission was 15.9 and it is now down to normal.

 

 

He has been on antibiotics in the form of Vancomycin and piperacillin.

Currently tells me that he has very little pain in the scrotum except for when

he moves.  He states that the size of the scrotum has decreased markedly.

There has been no culture from penis and he has had no penile drainage. It

appears that the scrotal cellulitis is improving.

 

PAST MEDICAL HISTORY:

unremarkable.  The patient has newly diagnosed diabetes.

He also is noted to have had a stroke.

 

ALLERGIES

NO KNOWN DRUG ALLERGIES.

 

MEDICATIONS

1. Vancomycin.

2. Piperacillin / tazobactam.

3. Flomax.

4. Aspirin

5. Levemir.

6. Pepcid.

 

 

 

 

 

SOCIAL HISTORY

No tobacco, no alcohol.  No illicit drugs.

 

FAMILY HISTORY

Noncontributory.

 

REVIEW OF SYSTEMS

Significant for constipation and scrotal pain.  Otherwise negative.

 

PHYSICAL EXAMINATION

IN GENERAL:  This is a slender male in no acute distress.  He is awake and

alert and oriented.

VITAL SIGNS: Temperature.  98.4, Blood pressure 96/56, respirations 20, heart

rate 100.

HEAD, EYES, EARS, NOSE, AND THROAT: Head is atraumatic.  Extraocular movements

grossly intact, pupils reactive to light.  No icterus.  Oropharynx moist mucosa

without lesions.

NECK:  The neck is supple without adenopathy.

LUNGS: Clear breath sounds

HEART:  Regular S1, S2, no murmurs, rubs or gallops.

ABDOMEN: Bowel sounds present, soft, nontender.

RECTUM: A Rectal was not preformed.

SCROTUM: The scrotum has mild erythema and has dry skin with superficial

cracking of the skin.

EXTREMITIES: No clubbing, cyanosis or edema.

SKIN: No rash.

NEUROLOGIC:  Nonfocal.

PSYCHIATRIC:  The patient is calm and cooperative.

 

LABORATORY DATA

WBC 8.0, platelets 159, creatinine 0.59, estimated .

 

IMPRESSION

Scrotal cellulitis which appears to be improving.

 

 

 

 

 

 

 

 

 

 

 

 

 

RECOMMENDATIONS:

1.  discontinue vancomycin.

2.  Discontinue piperacillin / tazobactam.

3.  Begin Ciprofloxacin 500 mg p.o. b.i.d. and treat the patient  for 7 to 10

   more days.

 

                              _________________________________

                              Amelia Medina MD FD/mary

D:  1/3/2018/3:32 PM

T:  1/3/2018/4:25 PM

Visit #:  [id number]

Job #:  [id number]

## 2018-01-03 NOTE — HHI.DS
__________________________________________________





Discharge Summary


Admission Date


Dec 28, 2017 at 23:22


Discharge Date:  Paul 3, 2018


Admitting Diagnosis





DKA, new onset diabetes, renal deficiency, closed head injury





(1) Uncontrolled diabetes mellitus


ICD Code:  E11.65 - Type 2 diabetes mellitus with hyperglycemia


(2) DKA (diabetic ketoacidoses)


ICD Code:  E13.10 - Other specified diabetes mellitus with ketoacidosis without 

coma


Status:  Acute


(3) Type 2 diabetes mellitus with hyperosmolar nonketotic hyperglycemia


ICD Code:  E11.01 - Type 2 diabetes mellitus with hyperosmolarity with coma


Status:  Chronic


(4) GRACIELA (acute kidney injury)


ICD Code:  N17.9 - Acute kidney failure, unspecified


Status:  Acute


(5) Scrotal edema


ICD Code:  N50.89 - Other specified disorders of the male genital organs


Status:  Acute


(6) Lacunar stroke


ICD Code:  I63.9 - Cerebral infarction, unspecified


Status:  Chronic


(7) Renal insufficiency


ICD Code:  N28.9 - Disorder of kidney and ureter, unspecified


Status:  Acute


(8) Urinary retention


ICD Code:  R33.9 - Retention of urine, unspecified


Status:  Acute


Procedures


none


Brief History - From Admission


68 yo WM with no significant known medical history though he has not been 

followed by a physician who presents to [facility] emergency 

department complaining of two-week history of excessive thirst, weight loss, 

decreased appetite, and feeling weak.  Today he slipped and fell and hit his 

head on the tile floor.  His wife states no loss of consciousness.  Head CT 

showed no acute traumatic injury.  There is an age-indeterminate left thalamic 

infarct.  C-spine was negative.  He has hyperosmolar hyperglycemic state with 

glucose greater than 1600.  Beta hydroxybutyrate is normal.  Anion gap is 17.  

Potassium is 5.9.  He also has severe scrotal swelling and pain.  He states 

this is been going on for 2 days.  He has had urinary incontinence, dysuria and 

so the scrotal skin has remained wet and the skin is weeping and tender.   Now 

he states unable to void.  He denies flank pain, vomiting, diarrhea, fever, 

cough.  He has received 1 L normal saline bolus and insulin 6 units IV bolus in 

the emergency department.  Vancomycin and Zosyn has been ordered.  Ultrasound 

of the scrotum showed edema, normal flow to testicle.


CBC/BMP:  


1/2/18 0654                                                                    

            1/2/18 0654





Significant Findings





Laboratory Tests








Test


  1/1/18


06:56 1/1/18


14:45 1/2/18


06:54


 


Red Blood Count


  3.58 MIL/MM3


(4.50-5.90) 


  3.35 MIL/MM3


(4.50-5.90)


 


Hemoglobin


  10.9 GM/DL


(13.0-17.0) 


  10.3 GM/DL


(13.0-17.0)


 


Hematocrit


  30.7 %


(39.0-51.0) 


  28.4 %


(39.0-51.0)


 


Neutrophils (%) (Auto)


  87.9 %


(16.0-70.0) 


  79.5 %


(16.0-70.0)


 


Lymphocytes (%) (Auto)


  7.3 %


(9.0-44.0) 


  


 


 


Neutrophils # (Auto)


  9.5 TH/MM3


(1.8-7.7) 


  


 


 


Lymphocytes # (Auto)


  0.8 TH/MM3


(1.0-4.8) 


  


 


 


Blood Urea Nitrogen


  19 MG/DL


(7-18) 


  19 MG/DL


(7-18)


 


Random Glucose


  206 MG/DL


() 


  110 MG/DL


()


 


Calcium Level


  7.6 MG/DL


(8.5-10.1) 


  7.6 MG/DL


(8.5-10.1)


 


Potassium Level


  3.2 MEQ/L


(3.5-5.1) 


  3.1 MEQ/L


(3.5-5.1)


 


Creatinine


  


  


  0.59 MG/DL


(0.60-1.30)








Imaging





Reported Meds & Active Scripts


Active


Ciprofloxacin (Ciprofloxacin HCl) 500 Mg Tab 500 Mg PO BID


Sharpsafety Sharps Contai (Parenteral Therapy Supplies) 1 Mis Mis Ea .ROUTE AS 

DIRECTED


Glucocom Test Strips (Blood Glucose Test Strips) 1 Zahraa Zahraa Ea .ROUTE AS DIRECTED


Lancets 1 Mis Mis Ea .ROUTE AS DIRECTED


Insulin Syringe/U-100/31G X 5/16" 1 ml 31 Gauge X 5/16" Mis Ea .ROUTE AS 

DIRECTED


Glucocom Blood Glucose Mo W/Device (Device) 1 Kit Kit Kit .ROUTE AS DIRECTED


Norco (Hydrocodone-Acetaminophen) 5 Mg-325 Mg Tab 1 Tab PO Q8HR PRN


Gnp Senna Plus 8.6-50 mg (Sennosides-Docusate Sodium) 8.6 Mg-50 Mg Tab 1 Tab PO 

BID


Clotrimazole Eileen (Clotrimazole) 10 Mg Troc 10 Mg BUCCAL 5 TIMES A DAY 7 Days


Levemir Inj (Insulin Detemir) 1,000 unit/ 10 ML Vial 5 Units SQ Q12HR


     Do not mix with any other Insulin.


Flomax (Tamsulosin HCl) 0.4 Mg Cap 0.4 Mg PO DAILY


PE at Discharge


GENERAL: 68 yo male sitting up in ISC bed. 


CARDIOVASCULAR: Regular rate and rhythm, no murmurs, rubs, gallops


RESPIRATORY: Breathing comfortably with no accessory muscle use.  Clear to 

auscultation bilaterally.  On RA.   No wheezes, Rales, rhonchi.


GASTROINTESTINAL: Abdomen soft, nondistended, bowel sounds present, no rebound 

or guarding.  He has edema overlying lower abdomen/pelvis and this extends up 

into the RLQ with edema there. The erythema that was once there is improved. 

The edema is improving too.  The edematous area is mildly tender, but much 

better than yesterday.  There is no mass/fluctuance.  No CVAT. 


SKIN/SCROTUM:  warm, dry, well perfused. .   The scrotum is diffusely swollen 

and the skin is excoriated and weeping serous fluid. No fluctuant mass 

appreciated. No necrotizing skin lesion noted. No perianal erythema. I do not 

appreciate any fluctuance on the penis. 


MUSCULOSKELETAL: There is mild dependent edema of right upper thigh.  No 

obvious deformities. 


NEUROLOGICAL: Awake and alert during answering questions appropriately..  

Normal speech.  Moves all extremities without focal deficit.


Pt update on day of discharge


Feels better and is plan to go to rehab today. No fever or chills. Eating better


Hospital Course


68 yo WM with no significant known medical history though he has not been 

followed by a physician who presents to [facility] emergency 

department complaining of two-week history of excessive thirst, weight loss, 

decreased appetite, and feeling weak. PTA he slipped and fell and hit his head 

on the tile floor.  His wife states no loss of consciousness.  Head CT showed 

no acute traumatic injury.  There is an age-indeterminate left thalamic 

infarct.  C-spine was negative.  He has hyperosmolar hyperglycemic state with 

glucose greater than 1600.  Beta hydroxybutyrate was  normal.  Anion gap is 17.

  Potassium is 5.9.  He also has severe scrotal swelling and pain.  He states 

this is been going on for 2 days PTA.  He has had urinary incontinence, dysuria 

and so the scrotal skin has remained wet and the skin is weeping and tender.   

Now he states unable to void.  He denies flank pain, vomiting, diarrhea, fever, 

cough.  He has received 1 L normal saline bolus and insulin 6 units IV bolus in 

the emergency department.  Vancomycin and Zosyn , IV antibiotics, was DC on 

ciprofloxacin 500 mg po bid.  Ultrasound of the scrotum showed edema, normal 

flow to testicle. Urology was consulted recommended nonsurgical , abx. Wyatt grover was DC home with home health to follow up as Op with PCP and 

consultants 














Status post mechanical fall


Age indeterminate Left thalamic lacunar infarct





CT brain -revealed an age-indeterminate left thalamic infarct.


CT Cspine -degenerative changes noted.


Carotid Dopplers - no flow limiting stenosis


2-D echo no evidence of embolic source


MRI/A brain - possible tiny subcentimeter area acute infarction left posterior 

temporal lobe. MRA with distal atherosclerotic changes. 


Lipid panel revealed a low cholesterol/HDL.


Initiate antiplatelet therapy with  mg po daily. Discussed with urology, 

should not affect ability to perform debridement. 





Currently on acetaminophen 650 mg every 6 hours when necessary fever/pain 1- 2.


Hydrocodone/acetaminophen 5/325 one tablet every 4 hours when necessary pain 3-5


Morphine sulfate 2 mg IV every 2 hours when necessary breakthrough pain





Out of bed daily, PT/OT. 





RESP:


On RA. 


Incentive spirometry while awake


Chest x-ray 12/29 revealed no acute cardiopulmonary disease





CV:


Hypotension - severe sepsis


Lactic acidemia


S/p aggressive fluid resusc for hyperosmolar hyperglycemic state.


Did not require vasopressors.


Ace I on hold upon admission due to GRACIELA. 


Troponin 0.02


2-D echocardiogram - EF 60-65%.  Pulmonary artery pressure 35 mmHg.  Small 

pericardial effusion without hemodynamic compromise.





GI: 


Hypoalbuminemia


Elevated alkaline phosphatase


Speech therapy advance to soft diet and liquids on 12/30 2000 ADA


Famotidine 10 mg twice a day for GI prophylaxis


Docusate sodium/senna 1 tablet twice a day for bowel regimen





FEN/RENAL/: 





Pseudohyponatremia, resolved. 


Acute kidney injury


Urinary obstruction. Bilateral hydronephrosis/hydroureter -


Penis fluid collection - possible early abscess per CT report





Scrotal ultrasound -normal testes.  No flow limiting effect noted.  Diffuse 

scrotal edema.


CT abdomen/pelvis - Marked scrotal skin thickening and scrotal edema. No gas 

identified in the soft tissues.  Elongated fluid density within the left side 

of the base of the penis 5.5 x 1 cm may represent early abscess formation.  

Diffuse severe superficial soft tissue edema of the lower abdomen, pelvis, and 

proximal lower extremities.  Moderate bilateral hydronephrosis and diffuse 

hydroureter as well as marked distention of urinary bladder. Findings suggest 

possible bladder outlet obstruction. Prostate measures 4.1 x 2.9 cm in 

transverse dimensions. 


Maintain 14 Thai coud catheter


Urology consult Dr. Foster recommends continue with Bautista catheter to gravity 

drainage until outpatient urologic workup can be completed.


   will eventually require cystoscopy and possibly a full urodynamics 

evaluation as an outpatient.


   Pitting CT  scan of the abdomen and pelvis  12/31.


   Discussed with Dr. Renteria. 


Urine eosinophils 1-2


 Start tamulosin 0.4 mg po daily. 





ID:





Thrush


Bacteriuria likely UTI


Scrotal cellulitis





Received vancomycin, piperacillin/tazobactam and clindamycin for scrotal 

cellulitis/urinary tract infection.


Fluconazole 100 mg IV x1.  Given 12/29


Clotrimazole 10 mg 5 times daily for oral thrush





Pertinent cultures





12/29 - blood cultures 2 - neg


12/29 - urine culture - neg





Patient to have ciprofloxacin at DC per ID specialist





HEME: 





Leukocytosis - neutrophil predominant


Normocytic anemia





Monitor CBC daily.  Follow trends


Coags c/w low grade DIC, fibrinogen preserved. 





ENDO:





Hyperosmolar hyperglycemic nonketotic state, resolved





off  insulin drip


On medium dose insulin sliding scale. Detemir 5 subcut q12. 





Follow-up hemoglobin A1c


TSH 0.432





PROPH: 





SCD//Heparin subcutaneous for DVT prophylaxis


Famotidine for stress ulcer prophylaxis.





ACCESS:  RIJ CVL 12/29 #3. D/c CVL 





Per  Dr Renteria urology not intending surgical debridement.


Consult PT/OT, recommends PT at home.  However the patient and family doesn't 

feel comfortable to go home because he needed 2 people to assist to get out of 

the bed will have PT reevaluate patient.





Improved cleared by consultants for DC to follow up as Op with pCP and 

consultants. PT recommends home with home health PT


Pt Condition on Discharge:  Stable


Discharge Disposition:  Discharge to SNF


Discharge Time:  > 30 minutes


Discharge Instructions


DIET: Follow Instructions for:  Heart Healthy Diet, Diabetic Diet


Activities you can perform:  Regular-No Restrictions


Follow up Referrals:  


Appointment for Follow Up


Neurology - 1 Week


PCP Follow-up - 2-3 Days


Urology - 1 Week with Kurt Renteria DO





New Medications:  


Blood Glucose Monitoring W/Device (Glucocom Blood Glucose Mo W/Device) 1 Kit Kit


KIT .ROUTE AS DIRECTED for Blood Sugar Management, #1





Ciprofloxacin (Ciprofloxacin) 500 Mg Tab


500 MG PO BID for Infection, #20 TAB 0 Refills





Glucocom Test Strips (Glucocom Test Strips) 1 Zahraa Zahraa


EA .ROUTE AS DIRECTED for Blood Sugar Management, #1





Hydrocodone-Acetaminophen (Norco) 5 Mg-325 Mg Tab


1 TAB PO Q8HR PRN for PAIN, #10 TAB 0 Refills





Insulin Syringe/U-100/31G X 5/16" 1 ml (Insulin Syringe/U-100/31G X 5/16" 1 ml) 

31 Gauge X 5/16" Mis


EA .ROUTE AS DIRECTED for Blood Sugar Management, #1 0 Refills





Lancets (Lancets) 1 Mis Mis


EA .ROUTE AS DIRECTED for Blood Sugar Management, #1 0 Refills





Parenteral Therapy Supplies (Sharpsafety Sharps Contai) 1 Mis Mis


EA .ROUTE AS DIRECTED, #1 0 Refills





Walker Rolling/GetGo (Walker Rolling/GetGo) 1 Mis Mis


EA .ROUTE AS DIRECTED, #1





Clotrimazole Eileen (Clotrimazole Eileen) 10 Mg Troc


10 MG BUCCAL 5 TIMES A DAY for infection for 7 Days, EILEEN





Insulin Detemir Inj (Levemir Inj) 1,000 unit/ 10 ML Vial


5 UNITS SQ Q12HR for Blood Sugar Management, #60 INJECTION


Do not mix with any other Insulin.


Sennosides-Docusate Sodium (Gnp Senna Plus 8.6-50 mg) 8.6 Mg-50 Mg Tab


1 TAB PO BID for Constipation, #60 TAB





Tamsulosin (Flomax) 0.4 Mg Cap


0.4 MG PO DAILY for urin ret, #30 CAP

















Pham Owen MD Paul 3, 2018 10:26

## 2018-01-03 NOTE — HHI.PR
Subjective


Remarks


Feels very weak, says he need 2 people to assist while gettign out of bed. Says 

she can't go home. No fever or chills.No cp. Feesl sob with effort.No n/v/d/c. 

Not eating much





Objective


Vitals





Vital Signs








  Date Time  Temp Pulse Resp B/P (MAP) Pulse Ox O2 Delivery O2 Flow Rate FiO2


 


1/3/18 08:15 97.5 82 20 143/79 (100) 93   


 


1/3/18 04:14  87      


 


1/3/18 04:00 99.1 89 17 136/70 (92) 94   


 


1/3/18 02:31  86      


 


1/3/18 00:00 98.6 81 18 144/70 (94) 94   


 


1/2/18 20:00 98.4 80 20 123/67 (85) 97   


 


1/2/18 20:00  82      


 


1/2/18 17:12 98.7 84 20 143/67 (92) 98   


 


1/2/18 12:00  90      


 


1/2/18 11:26 98.0 88 20 122/62 (82) 94   














I/O      


 


 1/2/18 1/2/18 1/2/18 1/3/18 1/3/18 1/3/18





 07:00 15:00 23:00 07:00 15:00 23:00


 


Intake Total  1070 ml 370 ml 350 ml  


 


Output Total  2450 ml 600 ml 250 ml  


 


Balance  -1380 ml -230 ml 100 ml  


 


      


 


Intake Oral  720 ml 120 ml   


 


IV Total  350 ml 250 ml 350 ml  


 


Output Urine Total  2450 ml 600 ml 250 ml  


 


# Bowel Movements  0 0 0  








Result Diagram:  


1/2/18 0654                                                                    

            1/2/18 0654





Imaging





Last Impressions








Abdomen/Pelvis CT 12/31/17 0000 Signed





Impressions: 





 Service Date/Time:  Sunday, December 31, 2017 12:49 - CONCLUSION:  1. 

Persistent 





 linear low density area seen in the left base of gas which appears less 





 prominent. An abscess cannot be excluded. 2. Dilatation of the collecting 





 systems and ureters bilaterally being worse on the left. No obstructing stones 





 are seen. This could reflect outlet obstruction at the bladder. The prostate 





 does not appear enlarged. The bladder is diffusely thickened. 3. Tiny 





 nonobstructing left renal stone. 4. Bilateral mild pleural effusions and a 

mild 





 amount of pericardial fluid seen. 5. Suspected anasarca.     Jd Araujo MD 


 


Head Magnetic Resonance Angiography 12/30/17 0900 Signed





Impressions: 





 Service Date/Time:  Saturday, December 30, 2017 21:09 - CONCLUSION: Slight 





 atherosclerotic changes of distal branches bilaterally, otherwise 

unremarkable.  





   ROBBY Mack MD 


 


Brain MRI 12/30/17 0900 Signed





Impressions: 





 Service Date/Time:  Saturday, December 30, 2017 21:09 - CONCLUSION: Possible 





 tiny subcentimeter area of acute infarction left posterior temporal lobe on 





 follow up is suggested.     ROBBY Mack MD 


 


Chest X-Ray 12/29/17 0927 Signed





Impressions: 





 Service Date/Time:  Friday, December 29, 2017 09:31 - CONCLUSION:  MD 


 


Carotid Artery Ultrasound 12/29/17 0000 Signed





Impressions: 





 Service Date/Time:  Friday, December 29, 2017 08:18 - CONCLUSION:  No evidence 





 of flow-limiting carotid stenosis.      Jd Steward MD 


 


Scrotum Ultrasound 12/28/17 0000 Signed





Impressions: 





 Service Date/Time:  Thursday, December 28, 2017 21:57 - CONCLUSION:  Prominent 





 scrotal edema. Testicles are unremarkable     Jd Steward MD 


 


Head CT 12/28/17 0000 Signed





Impressions: 





 Service Date/Time:  Thursday, December 28, 2017 22:28 - CONCLUSION: 





 Age-indeterminate left thalamic lacunar infarct. No other acute intracranial 





 findings identified.     Henry Zepeda MD 


 


Cervical Spine CT 12/28/17 0000 Signed





Impressions: 





 Service Date/Time:  Thursday, December 28, 2017 22:29 - CONCLUSION:  No 

evidence 





 of fracture. Multilevel degenerative findings.     Henry Zepeda MD 








Objective Remarks


GENERAL: 68 yo male sitting up in ISC bed. 


CARDIOVASCULAR: Regular rate and rhythm, no murmurs, rubs, gallops


RESPIRATORY: Breathing comfortably with no accessory muscle use.  Clear to 

auscultation bilaterally.  On RA.   No wheezes, Rales, rhonchi.


GASTROINTESTINAL: Abdomen soft, nondistended, bowel sounds present, no rebound 

or guarding.  He has edema overlying lower abdomen/pelvis and this extends up 

into the RLQ with edema there. The erythema that was once there is improved. 

The edema is improving too.  The edematous area is mildly tender, but much 

better than yesterday.  There is no mass/fluctuance.  No CVAT. 


SKIN/SCROTUM:  warm, dry, well perfused. .   The scrotum is diffusely swollen 

and the skin is excoriated and weeping serous fluid. No fluctuant mass 

appreciated. No necrotizing skin lesion noted. No perianal erythema. I do not 

appreciate any fluctuance on the penis. 


MUSCULOSKELETAL: There is mild dependent edema of right upper thigh.  No 

obvious deformities. 


NEUROLOGICAL: Awake and alert during answering questions appropriately..  

Normal speech.  Moves all extremities without focal deficit.


Date of Insertion:  Dec 29, 2017


Date of Insertion:  Dec 29, 2017


Line:  Central Venous Catheter


Side:  Right


Location:  Internal, Jugular





A/P


Assessment and Plan


Status post mechanical fall


Age indeterminate Left thalamic lacunar infarct





CT brain -revealed an age-indeterminate left thalamic infarct.


CT Cspine -degenerative changes noted.


Carotid Dopplers - no flow limiting stenosis


2-D echo no evidence of embolic source


MRI/A brain - possible tiny subcentimeter area acute infarction left posterior 

temporal lobe. MRA with distal atherosclerotic changes. 


Lipid panel revealed a low cholesterol/HDL.


Initiate antiplatelet therapy with  mg po daily. Discussed with urology, 

should not affect ability to perform debridement. 





Currently on acetaminophen 650 mg every 6 hours when necessary fever/pain 1- 2.


Hydrocodone/acetaminophen 5/325 one tablet every 4 hours when necessary pain 3-5


Morphine sulfate 2 mg IV every 2 hours when necessary breakthrough pain





Out of bed daily, PT/OT. 





RESP:


On RA. 


Incentive spirometry while awake


Chest x-ray 12/29 revealed no acute cardiopulmonary disease





CV:


Hypotension - severe sepsis


Lactic acidemia


S/p aggressive fluid resusc for hyperosmolar hyperglycemic state.


Did not require vasopressors.


Ace I on hold upon admission due to GRACIELA. 


Troponin 0.02


2-D echocardiogram - EF 60-65%.  Pulmonary artery pressure 35 mmHg.  Small 

pericardial effusion without hemodynamic compromise.





GI: 


Hypoalbuminemia


Elevated alkaline phosphatase


Speech therapy advance to soft diet and liquids on 12/30 2000 ADA


Famotidine 10 mg twice a day for GI prophylaxis


Docusate sodium/senna 1 tablet twice a day for bowel regimen





FEN/RENAL/: 





Pseudohyponatremia, resolved. 


Acute kidney injury


Urinary obstruction. Bilateral hydronephrosis/hydroureter -


Penis fluid collection - possible early abscess per CT report





Scrotal ultrasound -normal testes.  No flow limiting effect noted.  Diffuse 

scrotal edema.


CT abdomen/pelvis - Marked scrotal skin thickening and scrotal edema. No gas 

identified in the soft tissues.  Elongated fluid density within the left side 

of the base of the penis 5.5 x 1 cm may represent early abscess formation.  

Diffuse severe superficial soft tissue edema of the lower abdomen, pelvis, and 

proximal lower extremities.  Moderate bilateral hydronephrosis and diffuse 

hydroureter as well as marked distention of urinary bladder. Findings suggest 

possible bladder outlet obstruction. Prostate measures 4.1 x 2.9 cm in 

transverse dimensions. 


Maintain 14 [place] coud catheter


Urology consult Dr. Foster recommends continue with Bautista catheter to gravity 

drainage until outpatient urologic workup can be completed.


   will eventually require cystoscopy and possibly a full urodynamics 

evaluation as an outpatient.


   Pitting CT  scan of the abdomen and pelvis  12/31.


   Discussed with Dr. Renteria. 


Urine eosinophils 1-2


 Start tamulosin 0.4 mg po daily. 





ID:





Thrush


Bacteriuria likely UTI


Scrotal cellulitis





Day #4 vancomycin, piperacillin/tazobactam and clindamycin for scrotal 

cellulitis/urinary tract infection. If CT scan improved, would d/c clindamycin. 


Fluconazole 100 mg IV x1.  Given 12/29


Clotrimazole 10 mg 5 times daily for oral thrush





Pertinent cultures





12/29 - blood cultures 2 - neg


12/29 - urine culture - neg





HEME: 





Leukocytosis - neutrophil predominant


Normocytic anemia





Monitor CBC daily.  Follow trends


Coags c/w low grade DIC, fibrinogen preserved. 





ENDO:





Hyperosmolar hyperglycemic nonketotic state, resolved





off  insulin drip


On medium dose insulin sliding scale. Detemir 5 subcut q12. 





Follow-up hemoglobin A1c


TSH 0.432





PROPH: 





SCD//Heparin subcutaneous for DVT prophylaxis


Famotidine for stress ulcer prophylaxis.





ACCESS:  RIJ CVL 12/29 #3. D/c CVL 





Per  Dr Renteria urology not intending surgical debridement.


Consult PT/OT, recommends PT at home.  However the patient and family doesn't 

feel comfortable to go home because he needed 2 people to assist to get out of 

the bed will have PT reevaluate patient.





DC plan: pending improvement and clearance by consultants.











Pham Owen MD Paul 3, 2018 10:19

## 2018-01-04 VITALS
OXYGEN SATURATION: 94 % | HEART RATE: 79 BPM | DIASTOLIC BLOOD PRESSURE: 72 MMHG | SYSTOLIC BLOOD PRESSURE: 130 MMHG | RESPIRATION RATE: 18 BRPM | TEMPERATURE: 97.6 F

## 2018-01-04 VITALS
TEMPERATURE: 98.3 F | SYSTOLIC BLOOD PRESSURE: 128 MMHG | HEART RATE: 94 BPM | RESPIRATION RATE: 16 BRPM | DIASTOLIC BLOOD PRESSURE: 63 MMHG | OXYGEN SATURATION: 90 %

## 2018-01-04 VITALS
TEMPERATURE: 98.4 F | DIASTOLIC BLOOD PRESSURE: 62 MMHG | RESPIRATION RATE: 18 BRPM | OXYGEN SATURATION: 93 % | SYSTOLIC BLOOD PRESSURE: 133 MMHG | HEART RATE: 84 BPM

## 2018-01-04 VITALS — HEART RATE: 80 BPM

## 2018-01-04 VITALS — HEART RATE: 88 BPM

## 2018-01-04 LAB
BASOPHILS # BLD AUTO: 0 TH/MM3 (ref 0–0.2)
BASOPHILS NFR BLD: 0.1 % (ref 0–2)
BUN SERPL-MCNC: 14 MG/DL (ref 7–18)
CALCIUM SERPL-MCNC: 7.2 MG/DL (ref 8.5–10.1)
CALCIUM TP COR SERPL-MCNC: 8.5 MG/DL (ref 8.5–10.1)
CHLORIDE SERPL-SCNC: 97 MEQ/L (ref 98–107)
CREAT SERPL-MCNC: 0.61 MG/DL (ref 0.6–1.3)
EOSINOPHIL # BLD: 0.3 TH/MM3 (ref 0–0.4)
EOSINOPHIL NFR BLD: 3.3 % (ref 0–4)
ERYTHROCYTE [DISTWIDTH] IN BLOOD BY AUTOMATED COUNT: 12.3 % (ref 11.6–17.2)
GFR SERPLBLD BASED ON 1.73 SQ M-ARVRAT: 131 ML/MIN (ref 89–?)
GLUCOSE SERPL-MCNC: 135 MG/DL (ref 74–106)
HCO3 BLD-SCNC: 30.6 MEQ/L (ref 21–32)
HCT VFR BLD CALC: 28.2 % (ref 39–51)
HGB BLD-MCNC: 10 GM/DL (ref 13–17)
LYMPHOCYTES # BLD AUTO: 1 TH/MM3 (ref 1–4.8)
LYMPHOCYTES NFR BLD AUTO: 10.6 % (ref 9–44)
MAGNESIUM SERPL-MCNC: 1.6 MG/DL (ref 1.5–2.5)
MCH RBC QN AUTO: 30 PG (ref 27–34)
MCHC RBC AUTO-ENTMCNC: 35.3 % (ref 32–36)
MCV RBC AUTO: 85 FL (ref 80–100)
MONOCYTE #: 0.7 TH/MM3 (ref 0–0.9)
MONOCYTES NFR BLD: 6.7 % (ref 0–8)
NEUTROPHILS # BLD AUTO: 7.8 TH/MM3 (ref 1.8–7.7)
NEUTROPHILS NFR BLD AUTO: 79.3 % (ref 16–70)
PHOSPHATE SERPL-MCNC: 2.6 MG/DL (ref 2.5–4.9)
PLATELET # BLD: 184 TH/MM3 (ref 150–450)
PMV BLD AUTO: 9.7 FL (ref 7–11)
PROT SERPL-MCNC: 4.7 GM/DL (ref 6.4–8.2)
RBC # BLD AUTO: 3.32 MIL/MM3 (ref 4.5–5.9)
SODIUM SERPL-SCNC: 135 MEQ/L (ref 136–145)
WBC # BLD AUTO: 9.8 TH/MM3 (ref 4–11)

## 2018-01-04 RX ADMIN — ACYCLOVIR SCH UNITS: 800 TABLET ORAL at 08:54

## 2018-01-04 RX ADMIN — CLOTRIMAZOLE SCH MG: 10 LOZENGE ORAL at 08:55

## 2018-01-04 RX ADMIN — Medication SCH ML: at 08:54

## 2018-01-04 RX ADMIN — CIPROFLOXACIN HYDROCHLORIDE SCH MG: 500 TABLET, FILM COATED ORAL at 08:52

## 2018-01-04 RX ADMIN — TAMSULOSIN HYDROCHLORIDE SCH MG: 0.4 CAPSULE ORAL at 08:52

## 2018-01-04 RX ADMIN — HEPARIN SODIUM SCH UNITS: 10000 INJECTION, SOLUTION INTRAVENOUS; SUBCUTANEOUS at 04:18

## 2018-01-04 RX ADMIN — HUMAN INSULIN SCH: 100 INJECTION, SOLUTION SUBCUTANEOUS at 12:09

## 2018-01-04 RX ADMIN — ASPIRIN SCH MG: 325 TABLET ORAL at 08:51

## 2018-01-04 RX ADMIN — FAMOTIDINE SCH MG: 20 TABLET, FILM COATED ORAL at 08:51

## 2018-01-04 RX ADMIN — Medication SCH ML: at 08:53

## 2018-01-04 RX ADMIN — CLOTRIMAZOLE SCH MG: 10 LOZENGE ORAL at 04:13

## 2018-01-04 RX ADMIN — HUMAN INSULIN SCH: 100 INJECTION, SOLUTION SUBCUTANEOUS at 08:00

## 2018-01-04 RX ADMIN — STANDARDIZED SENNA CONCENTRATE AND DOCUSATE SODIUM SCH TAB: 8.6; 5 TABLET, FILM COATED ORAL at 08:52
